# Patient Record
Sex: MALE | Race: WHITE | NOT HISPANIC OR LATINO | Employment: OTHER | RURAL
[De-identification: names, ages, dates, MRNs, and addresses within clinical notes are randomized per-mention and may not be internally consistent; named-entity substitution may affect disease eponyms.]

---

## 2017-01-01 ENCOUNTER — OFFICE VISIT (OUTPATIENT)
Dept: FAMILY MEDICINE CLINIC | Facility: CLINIC | Age: 77
End: 2017-01-01

## 2017-01-01 ENCOUNTER — OFFICE VISIT (OUTPATIENT)
Dept: VASCULAR SURGERY | Facility: CLINIC | Age: 77
End: 2017-01-01

## 2017-01-01 ENCOUNTER — HOSPITAL ENCOUNTER (OUTPATIENT)
Dept: ULTRASOUND IMAGING | Facility: HOSPITAL | Age: 77
Discharge: HOME | End: 2017-12-04
Admitting: NURSE PRACTITIONER

## 2017-01-01 ENCOUNTER — LAB (OUTPATIENT)
Dept: FAMILY MEDICINE CLINIC | Facility: CLINIC | Age: 77
End: 2017-01-01

## 2017-01-01 VITALS
HEART RATE: 70 BPM | TEMPERATURE: 98.5 F | DIASTOLIC BLOOD PRESSURE: 78 MMHG | RESPIRATION RATE: 16 BRPM | WEIGHT: 210 LBS | HEIGHT: 67 IN | SYSTOLIC BLOOD PRESSURE: 142 MMHG | BODY MASS INDEX: 32.96 KG/M2

## 2017-01-01 VITALS
BODY MASS INDEX: 32.65 KG/M2 | HEART RATE: 102 BPM | HEIGHT: 67 IN | DIASTOLIC BLOOD PRESSURE: 98 MMHG | WEIGHT: 208 LBS | SYSTOLIC BLOOD PRESSURE: 158 MMHG

## 2017-01-01 DIAGNOSIS — E78.2 MIXED HYPERLIPIDEMIA: ICD-10-CM

## 2017-01-01 DIAGNOSIS — J43.8 OTHER EMPHYSEMA (HCC): Primary | ICD-10-CM

## 2017-01-01 DIAGNOSIS — Z00.00 WELLNESS EXAMINATION: ICD-10-CM

## 2017-01-01 DIAGNOSIS — Z12.5 ENCOUNTER FOR SCREENING FOR MALIGNANT NEOPLASM OF PROSTATE: ICD-10-CM

## 2017-01-01 DIAGNOSIS — I71.40 ABDOMINAL AORTIC ANEURYSM (AAA) WITHOUT RUPTURE (HCC): ICD-10-CM

## 2017-01-01 DIAGNOSIS — E78.5 HYPERLIPIDEMIA, UNSPECIFIED HYPERLIPIDEMIA TYPE: Primary | ICD-10-CM

## 2017-01-01 DIAGNOSIS — Z98.890 S/P AAA (ABDOMINAL AORTIC ANEURYSM) REPAIR: ICD-10-CM

## 2017-01-01 DIAGNOSIS — Z72.0 TOBACCO ABUSE: ICD-10-CM

## 2017-01-01 DIAGNOSIS — Z86.79 S/P AAA (ABDOMINAL AORTIC ANEURYSM) REPAIR: ICD-10-CM

## 2017-01-01 DIAGNOSIS — I71.40 ABDOMINAL AORTIC ANEURYSM (AAA) WITHOUT RUPTURE (HCC): Primary | ICD-10-CM

## 2017-01-01 DIAGNOSIS — I99.9 VASCULAR ABNORMALITY: ICD-10-CM

## 2017-01-01 LAB
ALBUMIN SERPL-MCNC: 4 G/DL (ref 3.5–5)
ALBUMIN/GLOB SERPL: 1.4 G/DL (ref 1.1–2.5)
ALP SERPL-CCNC: 88 U/L (ref 24–120)
ALT SERPL-CCNC: 36 U/L (ref 0–54)
AST SERPL-CCNC: 26 U/L (ref 7–45)
BASOPHILS # BLD AUTO: 0.1 10*3/MM3 (ref 0–0.2)
BASOPHILS NFR BLD AUTO: 1 % (ref 0–2)
BILIRUB SERPL-MCNC: 0.7 MG/DL (ref 0.1–1)
BUN SERPL-MCNC: 18 MG/DL (ref 5–21)
BUN/CREAT SERPL: 22.8 (ref 7–25)
CALCIUM SERPL-MCNC: 9.2 MG/DL (ref 8.4–10.4)
CHLORIDE SERPL-SCNC: 102 MMOL/L (ref 98–110)
CHOLEST SERPL-MCNC: 143 MG/DL (ref 130–200)
CO2 SERPL-SCNC: 29 MMOL/L (ref 24–31)
CREAT SERPL-MCNC: 0.79 MG/DL (ref 0.5–1.4)
EOSINOPHIL # BLD AUTO: 1 10*3/MM3 (ref 0–0.7)
EOSINOPHIL NFR BLD AUTO: 9.7 % (ref 0–4)
ERYTHROCYTE [DISTWIDTH] IN BLOOD BY AUTOMATED COUNT: 12.8 % (ref 12–15)
GLOBULIN SER CALC-MCNC: 2.9 GM/DL
GLUCOSE SERPL-MCNC: 94 MG/DL (ref 70–100)
HCT VFR BLD AUTO: 50.3 % (ref 40–52)
HDLC SERPL-MCNC: 43 MG/DL
HGB BLD-MCNC: 16.4 G/DL (ref 14–18)
IMM GRANULOCYTES # BLD: 0.06 10*3/MM3 (ref 0–0.03)
IMM GRANULOCYTES NFR BLD: 0.6 % (ref 0–5)
LDLC SERPL CALC-MCNC: 84 MG/DL (ref 0–99)
LYMPHOCYTES # BLD AUTO: 2.78 10*3/MM3 (ref 0.72–4.86)
LYMPHOCYTES NFR BLD AUTO: 27.1 % (ref 15–45)
MCH RBC QN AUTO: 31 PG (ref 28–32)
MCHC RBC AUTO-ENTMCNC: 32.6 G/DL (ref 33–36)
MCV RBC AUTO: 95.1 FL (ref 82–95)
MONOCYTES # BLD AUTO: 1.13 10*3/MM3 (ref 0.19–1.3)
MONOCYTES NFR BLD AUTO: 11 % (ref 4–12)
NEUTROPHILS # BLD AUTO: 5.19 10*3/MM3 (ref 1.87–8.4)
NEUTROPHILS NFR BLD AUTO: 50.6 % (ref 39–78)
NRBC BLD AUTO-RTO: 0 /100 WBC (ref 0–0)
PLATELET # BLD AUTO: 232 10*3/MM3 (ref 130–400)
POTASSIUM SERPL-SCNC: 4.5 MMOL/L (ref 3.5–5.3)
PROT SERPL-MCNC: 6.9 G/DL (ref 6.3–8.7)
PSA SERPL-MCNC: 0.88 NG/ML (ref 0–4)
RBC # BLD AUTO: 5.29 10*6/MM3 (ref 4.8–5.9)
SODIUM SERPL-SCNC: 142 MMOL/L (ref 135–145)
TRIGL SERPL-MCNC: 81 MG/DL (ref 0–149)
VLDLC SERPL CALC-MCNC: 16.2 MG/DL
WBC # BLD AUTO: 10.26 10*3/MM3 (ref 4.8–10.8)

## 2017-01-01 PROCEDURE — 99213 OFFICE O/P EST LOW 20 MIN: CPT | Performed by: NURSE PRACTITIONER

## 2017-01-01 PROCEDURE — 76775 US EXAM ABDO BACK WALL LIM: CPT

## 2017-01-01 PROCEDURE — 99214 OFFICE O/P EST MOD 30 MIN: CPT | Performed by: FAMILY MEDICINE

## 2017-01-01 RX ORDER — LOVASTATIN 20 MG/1
20 TABLET ORAL NIGHTLY
Qty: 90 TABLET | Refills: 3 | Status: SHIPPED | OUTPATIENT
Start: 2017-01-01

## 2017-01-01 RX ORDER — LOVASTATIN 20 MG/1
20 TABLET ORAL NIGHTLY
Qty: 90 TABLET | Refills: 3 | Status: SHIPPED | OUTPATIENT
Start: 2017-01-01 | End: 2017-01-01 | Stop reason: SDUPTHER

## 2017-01-01 RX ORDER — ASPIRIN 81 MG/1
81 TABLET ORAL DAILY
COMMUNITY
End: 2018-01-01 | Stop reason: HOSPADM

## 2017-01-23 ENCOUNTER — TELEPHONE (OUTPATIENT)
Dept: FAMILY MEDICINE CLINIC | Facility: CLINIC | Age: 77
End: 2017-01-23

## 2017-01-23 RX ORDER — BUDESONIDE AND FORMOTEROL FUMARATE DIHYDRATE 160; 4.5 UG/1; UG/1
2 AEROSOL RESPIRATORY (INHALATION)
Qty: 3 INHALER | Refills: 1 | Status: SHIPPED | OUTPATIENT
Start: 2017-01-23 | End: 2017-06-28 | Stop reason: SDUPTHER

## 2017-01-24 ENCOUNTER — TELEPHONE (OUTPATIENT)
Dept: FAMILY MEDICINE CLINIC | Facility: CLINIC | Age: 77
End: 2017-01-24

## 2017-01-24 NOTE — TELEPHONE ENCOUNTER
Pt called cristobal the symbicort does work but he can not afford this med he wants to know if u can change to something cheaper [phone]

## 2017-06-02 ENCOUNTER — HOSPITAL ENCOUNTER (OUTPATIENT)
Dept: ULTRASOUND IMAGING | Facility: HOSPITAL | Age: 77
Discharge: HOME | End: 2017-06-02
Admitting: NURSE PRACTITIONER

## 2017-06-02 ENCOUNTER — OFFICE VISIT (OUTPATIENT)
Dept: VASCULAR SURGERY | Facility: CLINIC | Age: 77
End: 2017-06-02

## 2017-06-02 VITALS
WEIGHT: 200 LBS | DIASTOLIC BLOOD PRESSURE: 68 MMHG | HEIGHT: 67 IN | SYSTOLIC BLOOD PRESSURE: 118 MMHG | HEART RATE: 90 BPM | BODY MASS INDEX: 31.39 KG/M2

## 2017-06-02 DIAGNOSIS — I71.40 ABDOMINAL AORTIC ANEURYSM (AAA) WITHOUT RUPTURE (HCC): Primary | ICD-10-CM

## 2017-06-02 DIAGNOSIS — Z72.0 TOBACCO ABUSE: ICD-10-CM

## 2017-06-02 DIAGNOSIS — I71.40 ABDOMINAL AORTIC ANEURYSM (AAA) WITHOUT RUPTURE (HCC): ICD-10-CM

## 2017-06-02 PROCEDURE — 99214 OFFICE O/P EST MOD 30 MIN: CPT | Performed by: NURSE PRACTITIONER

## 2017-06-02 PROCEDURE — 76775 US EXAM ABDO BACK WALL LIM: CPT

## 2017-06-02 NOTE — PROGRESS NOTES
"06/02/2017      Jake Keller MD  [address] [place] [id number]        Dipesh Pruitt  1940    Chief Complaint   Patient presents with   • Follow-up     Abdominal ultrasound results.Denies symptoms.       Dear Jake Keller MD:    HPI     I had the pleasure of seeing you patient in the office today for follow up.  As you recall, the patient is a 76 y.o. male who started having some pain in his left leg. He had some lumbar x-rays revealing a possible large infrarenal abdominal aortic aneurysm. He denied any back pain or abdominal pain. He went then for a CTA of the abdomen and pelvis, which I personally reviewed, which shows an aneurysm measuring 6.7 cm. He did undergo endovascular abdominal aortic aneurysm repair on 10/19/16. He did have noninvasive testing done, which I did review as well as Dr. Rosado.         /68  Pulse 90  Ht 67\" (170.2 cm)  Wt 200 lb (90.7 kg)  BMI 31.32 kg/m2  Physical Exam  Constitutional: He is oriented to person, place, and time. He appears well-developed and well-nourished. No distress.   HENT:   Head: Normocephalic and atraumatic.   Mouth/Throat: Oropharynx is clear and moist and mucous membranes are normal.   Eyes: Pupils are equal, round, and reactive to light.   Neck: Normal range of motion. Neck supple. No JVD present. Carotid bruit is not present. No thyromegaly present.   Cardiovascular: Normal rate, regular rhythm, S1 normal, S2 normal and normal heart sounds. Exam reveals no gallop and no friction rub.   No murmur heard.  Pulses:  Femoral pulses are 2+ on the right side, and 2+ on the left side.  Popliteal pulses are 2+ on the right side, and 2+ on the left side.   Dorsalis pedis pulses are 0 on the right side, and 2+ on the left side.   Posterior tibial pulses are 2+ on the right side, and 2+ on the left side.   Bilateral groins healed   Right -DP doppler   Pulmonary/Chest: Effort normal and breath sounds normal.   Abdominal: Soft. " Normal appearance, normal aorta and bowel sounds are normal. He exhibits no abdominal bruit. There is no hepatosplenomegaly. There is no tenderness.   Musculoskeletal: Normal range of motion.     Vascular Status - His exam exhibits no right foot edema. His exam exhibits no left foot edema.  Neurological: He is alert and oriented to person, place, and time. He has normal strength. No cranial nerve deficit.   Skin: Skin is warm, dry and intact. He is not diaphoretic.   Psychiatric: He has a normal mood and affect. His behavior is normal. Judgment and thought content normal. Cognition and memory are normal.   Vitals reviewed.    DIAGNOSTIC DATA:    Us Aorta Limited    Result Date: 6/2/2017  Narrative: EXAMINATION:   US AORTA LIMITED-  6/2/2017 10:52 AM CDT  HISTORY: Abdominal aortic aneurysm  The patient is not NPO.  Images are obtained in transverse and longitudinal direction in the usual manner. The proximal aorta is obscured by bowel gas.  The mid aorta is 2.2 x 2.2 cm.  The infrarenal abdominal aorta is 6.5 x 6.5 cm. An endovascular stent graft is present. Flow is demonstrated in the stent graft. Flow is demonstrated in the endovascular iliac stents.      Impression: Native aorta is 6.5 x 6.5 cm. This contains an endovascular stent graft and flow is present in the stent. This report was finalized on 06/02/2017 13:50 by Dr. Toribio Martin MD.      Patient Active Problem List   Diagnosis   • Abdominal aortic aneurysm without rupture   • Preop cardiovascular exam   • Tobacco abuse   • Chronic obstructive pulmonary disease   • Vascular abnormality         ICD-10-CM ICD-9-CM   1. Abdominal aortic aneurysm (AAA) without rupture I71.4 441.4   2. Tobacco abuse Z72.0 305.1       Lab Frequency Next Occurrence   Duplex Carotid Ultrasound CAR Once 9/29/2016   CT Angiogram Abdomen Pelvis With & Without Contrast Once 11/23/2016   US Aorta Limited Once 12/2/2017       PLAN: After thoroughly evaluating Dipesh Pruitt I  believe the best course of action is to remain conservative from a vascular standpoint.  We will see Dipesh Pruitt back in 6 months with repeat noninvasive testing for continued surveillance.  The patient is to continue taking their medications as previously discussed.   This was all discussed in full with complete understanding.  Thank you for allowing me to participate in the care of your patient.  Please do not hesitate to call with any questions or concerns.  We will keep you aware of any further encounters with Dipesh Pruitt.      Sincerely Yours,        CALLIE Gilliam

## 2017-06-19 ENCOUNTER — LAB (OUTPATIENT)
Dept: FAMILY MEDICINE CLINIC | Facility: CLINIC | Age: 77
End: 2017-06-19

## 2017-06-19 DIAGNOSIS — E78.5 HYPERLIPIDEMIA, UNSPECIFIED HYPERLIPIDEMIA TYPE: Primary | ICD-10-CM

## 2017-06-19 LAB
ALBUMIN SERPL-MCNC: 3.8 G/DL (ref 3.5–5)
ALBUMIN/GLOB SERPL: 1.4 G/DL (ref 1.1–2.5)
ALP SERPL-CCNC: 80 U/L (ref 24–120)
ALT SERPL-CCNC: 30 U/L (ref 0–54)
AST SERPL-CCNC: 22 U/L (ref 7–45)
BASOPHILS # BLD AUTO: 0.08 10*3/MM3 (ref 0–0.2)
BASOPHILS NFR BLD AUTO: 0.7 % (ref 0–2)
BILIRUB SERPL-MCNC: 1 MG/DL (ref 0.1–1)
BUN SERPL-MCNC: 17 MG/DL (ref 5–21)
BUN/CREAT SERPL: 21.3 (ref 7–25)
CALCIUM SERPL-MCNC: 9.2 MG/DL (ref 8.4–10.4)
CHLORIDE SERPL-SCNC: 105 MMOL/L (ref 98–110)
CHOLEST SERPL-MCNC: 140 MG/DL (ref 130–200)
CO2 SERPL-SCNC: 27 MMOL/L (ref 24–31)
CREAT SERPL-MCNC: 0.8 MG/DL (ref 0.5–1.4)
EOSINOPHIL # BLD AUTO: 1.42 10*3/MM3 (ref 0–0.7)
EOSINOPHIL NFR BLD AUTO: 12.9 % (ref 0–4)
ERYTHROCYTE [DISTWIDTH] IN BLOOD BY AUTOMATED COUNT: 13.7 % (ref 12–15)
GLOBULIN SER CALC-MCNC: 2.8 GM/DL
GLUCOSE SERPL-MCNC: 87 MG/DL (ref 70–100)
HCT VFR BLD AUTO: 49.3 % (ref 40–52)
HDLC SERPL-MCNC: 44 MG/DL
HGB BLD-MCNC: 16.3 G/DL (ref 14–18)
IMM GRANULOCYTES # BLD: 0.06 10*3/MM3 (ref 0–0.03)
IMM GRANULOCYTES NFR BLD: 0.5 % (ref 0–5)
LDLC SERPL CALC-MCNC: 79 MG/DL (ref 0–99)
LYMPHOCYTES # BLD AUTO: 2.8 10*3/MM3 (ref 0.72–4.86)
LYMPHOCYTES NFR BLD AUTO: 25.4 % (ref 15–45)
MCH RBC QN AUTO: 31.1 PG (ref 28–32)
MCHC RBC AUTO-ENTMCNC: 33.1 G/DL (ref 33–36)
MCV RBC AUTO: 94.1 FL (ref 82–95)
MONOCYTES # BLD AUTO: 1.2 10*3/MM3 (ref 0.19–1.3)
MONOCYTES NFR BLD AUTO: 10.9 % (ref 4–12)
NEUTROPHILS # BLD AUTO: 5.48 10*3/MM3 (ref 1.87–8.4)
NEUTROPHILS NFR BLD AUTO: 49.6 % (ref 39–78)
PLATELET # BLD AUTO: 185 10*3/MM3 (ref 130–400)
POTASSIUM SERPL-SCNC: 4.3 MMOL/L (ref 3.5–5.3)
PROT SERPL-MCNC: 6.6 G/DL (ref 6.3–8.7)
RBC # BLD AUTO: 5.24 10*6/MM3 (ref 4.8–5.9)
SODIUM SERPL-SCNC: 142 MMOL/L (ref 135–145)
TRIGL SERPL-MCNC: 84 MG/DL (ref 0–149)
VLDLC SERPL CALC-MCNC: 16.8 MG/DL
WBC # BLD AUTO: 11.04 10*3/MM3 (ref 4.8–10.8)

## 2017-06-28 ENCOUNTER — OFFICE VISIT (OUTPATIENT)
Dept: FAMILY MEDICINE CLINIC | Facility: CLINIC | Age: 77
End: 2017-06-28

## 2017-06-28 VITALS
BODY MASS INDEX: 31.71 KG/M2 | HEIGHT: 67 IN | HEART RATE: 72 BPM | RESPIRATION RATE: 18 BRPM | SYSTOLIC BLOOD PRESSURE: 122 MMHG | OXYGEN SATURATION: 92 % | WEIGHT: 202 LBS | DIASTOLIC BLOOD PRESSURE: 72 MMHG

## 2017-06-28 DIAGNOSIS — E78.2 MIXED HYPERLIPIDEMIA: ICD-10-CM

## 2017-06-28 DIAGNOSIS — J43.8 OTHER EMPHYSEMA (HCC): Primary | ICD-10-CM

## 2017-06-28 DIAGNOSIS — Z72.0 TOBACCO ABUSE: ICD-10-CM

## 2017-06-28 DIAGNOSIS — C44.90 SKIN CANCER: ICD-10-CM

## 2017-06-28 PROCEDURE — 99214 OFFICE O/P EST MOD 30 MIN: CPT | Performed by: FAMILY MEDICINE

## 2017-06-28 RX ORDER — LOVASTATIN 20 MG/1
20 TABLET ORAL NIGHTLY
Qty: 90 TABLET | Refills: 1 | Status: SHIPPED | OUTPATIENT
Start: 2017-06-28 | End: 2017-01-01 | Stop reason: SDUPTHER

## 2017-06-28 RX ORDER — BUDESONIDE AND FORMOTEROL FUMARATE DIHYDRATE 160; 4.5 UG/1; UG/1
2 AEROSOL RESPIRATORY (INHALATION)
Qty: 3 INHALER | Refills: 1 | Status: SHIPPED | OUTPATIENT
Start: 2017-06-28 | End: 2017-01-01

## 2017-06-28 NOTE — PROGRESS NOTES
"Subjective   Dipesh Pruitt is a 76 y.o. male.     Chief Complaint   Patient presents with   • Follow-up     6 mo        History of Present Illness     he notes his breathing is stable --denies any hemoptysis ..toerating mevacor without myaglsi --still smoking 1/2 ppd..still seeing dr del valle after AAA repair ...seeing derm q 6mos for skin ca      Current Outpatient Prescriptions:   •  aspirin 81 MG tablet, Take 81 mg by mouth., Disp: , Rfl:   •  budesonide-formoterol (SYMBICORT) 160-4.5 MCG/ACT inhaler, Inhale 2 puffs 2 (Two) Times a Day., Disp: 3 inhaler, Rfl: 1  •  lovastatin (MEVACOR) 20 MG tablet, Take 1 tablet by mouth Every Night., Disp: 30 tablet, Rfl: 5  No Known Allergies    Past Medical History:   Diagnosis Date   • AAA (abdominal aortic aneurysm)    • Aneurysm     AAA   • COPD (chronic obstructive pulmonary disease)    • Full dentures    • Skin cancer     MULTIPLE   • Tobacco abuse    • Vision decreased     GLASSES     Past Surgical History:   Procedure Laterality Date   • CARDIOVASCULAR STRESS TEST  10/07/2016   • [place] AAA REPAIR,AORTO-AORTIC TUBE PROSTH N/A 10/19/2016    Procedure: ABDOMINAL AORTIC ANEURYSM REPAIR WITH ENDOGRAFT;  Surgeon: Reilly Del Valle DO;  Location: [facility] OR;  Service: Vascular   • SKIN BIOPSY     • SKIN CANCER EXCISION     • UMBILICAL HERNIA REPAIR         Review of Systems   Constitutional: Negative.    HENT: Negative.    Eyes: Negative.    Respiratory: Positive for shortness of breath.    Cardiovascular: Negative.    Gastrointestinal: Negative.    Endocrine: Negative.    Genitourinary: Negative.    Musculoskeletal: Negative.    Skin: Negative.    Allergic/Immunologic: Negative.    Neurological: Negative.    Hematological: Negative.    Psychiatric/Behavioral: Negative.        Objective  /72  Pulse 72  Resp 18  Ht 67\" (170.2 cm)  Wt 202 lb (91.6 kg)  SpO2 92%  BMI 31.64 kg/m2  Physical Exam   Constitutional: He is oriented to person, place, and time. He " appears well-developed and well-nourished.   HENT:   Head: Normocephalic and atraumatic.   Right Ear: External ear normal.   Left Ear: External ear normal.   Nose: Nose normal.   Mouth/Throat: Oropharynx is clear and moist.   Eyes: Conjunctivae and EOM are normal. Pupils are equal, round, and reactive to light.   Neck: Normal range of motion. Neck supple.   Cardiovascular: Normal rate, regular rhythm, normal heart sounds and intact distal pulses.    Pulmonary/Chest: Effort normal and breath sounds normal.   Abdominal: Soft. Bowel sounds are normal.   Musculoskeletal: Normal range of motion.   Neurological: He is alert and oriented to person, place, and time. He has normal reflexes.   Skin: Skin is warm and dry.   Psychiatric: He has a normal mood and affect. His behavior is normal. Judgment and thought content normal.   Nursing note and vitals reviewed.      Assessment/Plan   Dipesh was seen today for follow-up.    Diagnoses and all orders for this visit:    Other emphysema    Tobacco abuse    Mixed hyperlipidemia    Skin cancer    he will continue f/u with vascular and derm      He is not doing to do colon ca screening.lwe disacussed blood and stool screen but he refuses but will consider       No orders of the defined types were placed in this encounter.      Follow up: 6 month(s)

## 2017-06-29 ENCOUNTER — TELEPHONE (OUTPATIENT)
Dept: FAMILY MEDICINE CLINIC | Facility: CLINIC | Age: 77
End: 2017-06-29

## 2017-06-29 NOTE — TELEPHONE ENCOUNTER
Dipesh' wife, Portia, called and said that the Symbicort that was rx'd yesterday is too expensive and the pharmacist recommended Breo instead.   Do you want to change to Breo or do you want me to give samples of Symbicort?  [phone]

## 2017-12-04 NOTE — PROGRESS NOTES
"12/04/2017       Jake Keller MD  [address] [place] [id number]        Dipesh Pruitt  1940    Chief Complaint   Patient presents with   • Follow-up     Follow up for AAA with U/S of aorta study.       Dear Jake Keller MD:    HPI     I had the pleasure of seeing you patient in the office today for follow up.  As you recall, the patient is a 77 y.o. male who started having some pain in his left leg. He had some lumbar x-rays revealing a possible large infrarenal abdominal aortic aneurysm. He denied any back pain or abdominal pain. He went then for a CTA of the abdomen and pelvis, which I personally reviewed, which shows an aneurysm measuring 6.7 cm. He did undergo endovascular abdominal aortic aneurysm repair on 10/19/16. He did have noninvasive testing done, which I did review as well as Dr. Rosado.         /98  Pulse 102  Ht 170.2 cm (67\")  Wt 94.3 kg (208 lb)  BMI 32.58 kg/m2  Physical Exam  Constitutional: He is oriented to person, place, and time. He appears well-developed and well-nourished. No distress.   HENT:   Head: Normocephalic and atraumatic.   Mouth/Throat: Oropharynx is clear and moist and mucous membranes are normal.   Eyes: Pupils are equal, round, and reactive to light.   Neck: Normal range of motion. Neck supple. No JVD present. Carotid bruit is not present. No thyromegaly present.   Cardiovascular: Normal rate, regular rhythm, S1 normal, S2 normal and normal heart sounds. Exam reveals no gallop and no friction rub.   No murmur heard.  Pulses:  Femoral pulses are 2+ on the right side, and 2+ on the left side.  Popliteal pulses are 2+ on the right side, and 2+ on the left side.   Dorsalis pedis pulses are 0 on the right side, and 2+ on the left side.   Posterior tibial pulses are 2+ on the right side, and 2+ on the left side.   Bilateral groins healed   Right -DP doppler   Pulmonary/Chest: Effort normal and breath sounds normal.   Abdominal: Soft. " Normal appearance, normal aorta and bowel sounds are normal. He exhibits no abdominal bruit. There is no hepatosplenomegaly. There is no tenderness.   Musculoskeletal: Normal range of motion.     Vascular Status - His exam exhibits no right foot edema. His exam exhibits no left foot edema.  Neurological: He is alert and oriented to person, place, and time. He has normal strength. No cranial nerve deficit.   Skin: Skin is warm, dry and intact. He is not diaphoretic.   Psychiatric: He has a normal mood and affect. His behavior is normal. Judgment and thought content normal. Cognition and memory are normal.   Vitals reviewed.    DIAGNOSTIC DATA:    Us Aorta Limited    Result Date: 12/4/2017  Narrative: EXAMINATION: US AORTA LIMITED- 12/4/2017 10:41 AM CST  HISTORY: Abdominal aortic aneurysm, previous endograft repair.  REPORT: Transabdominal sonographic images of the aorta were obtained, comparison is made with the previous ultrasound 16 2017.  The proximal aorta measures 3.2 x 3.1 cm, mid aorta is obscured by overlying bowel gas. The mid to distal aorta measures 2.3 x 2.3 cm, normal, the distal most aorta is aneurysmal with an endograft, the diameter measures 6.4 x 6.3 cm. Maximum diameter on the previous study of 6.5 x 6.5 cm.      Impression: Minimal decrease in maximum diameter of the fusiform infrarenal abdominal aortic aneurysm, 6.4 x 6.3 cm, compared with 6.5 x 6.5 cm previously. There is evidence of previous endograft repair. This report was finalized on 12/04/2017 10:44 by Dr. Coleman Nicole MD.      Patient Active Problem List   Diagnosis   • Abdominal aortic aneurysm without rupture   • Preop cardiovascular exam   • Tobacco abuse   • Chronic obstructive pulmonary disease   • Vascular abnormality   • Mixed hyperlipidemia   • Skin cancer         ICD-10-CM ICD-9-CM   1. Abdominal aortic aneurysm (AAA) without rupture I71.4 441.4   2. S/P AAA (abdominal aortic aneurysm) repair Z98.890 V45.89    Z86.79    3.  Mixed hyperlipidemia E78.2 272.2       Lab Frequency Next Occurrence   Duplex Carotid Ultrasound CAR Once 9/29/2016   CT Angiogram Abdomen Pelvis With & Without Contrast Once 11/23/2016   US Aorta Limited Once 12/2/2017       PLAN: After thoroughly evaluating Dipesh Pruitt, I believe the best course of action is to remain conservative from a vascular standpoint.  His testing remains stable and his aneurysm is smaller than previous.  We will see Dipesh Pruitt back in 6 months with repeat noninvasive testing for continued surveillance. I did discuss vascular risk factors as they pertain to the progression of vascular disease including controlling his hyperlipidemia.  The patient is to continue taking their medications as previously discussed.   This was all discussed in full with complete understanding.  Thank you for allowing me to participate in the care of your patient.  Please do not hesitate to call with any questions or concerns.  We will keep you aware of any further encounters with Dipesh Pruitt.      Sincerely Yours,        CALLIE Gilliam

## 2017-12-27 NOTE — PROGRESS NOTES
"Subjective   Dipesh Pruitt is a 77 y.o. male.     Chief Complaint   Patient presents with   • Follow-up        History of Present Illness     he continues to smoke 1/2 ppd--he is followed by dr del valle for vascualr issues--he is tolerating statin witout myalgias --we note his bp today wituout cp or ha      Current Outpatient Prescriptions:   •  aspirin 81 MG EC tablet, Take 81 mg by mouth Daily., Disp: , Rfl:   •  lovastatin (MEVACOR) 20 MG tablet, Take 1 tablet by mouth Every Night., Disp: 90 tablet, Rfl: 1  No Known Allergies    Past Medical History:   Diagnosis Date   • AAA (abdominal aortic aneurysm)    • Aneurysm     AAA   • COPD (chronic obstructive pulmonary disease)    • Full dentures    • Skin cancer     MULTIPLE   • Tobacco abuse    • Vision decreased     GLASSES     Past Surgical History:   Procedure Laterality Date   • CARDIOVASCULAR STRESS TEST  10/07/2016   • [place] AAA REPAIR,AORTO-AORTIC TUBE PROSTH N/A 10/19/2016    Procedure: ABDOMINAL AORTIC ANEURYSM REPAIR WITH ENDOGRAFT;  Surgeon: Reilly Del Valle DO;  Location: [facility] OR;  Service: Vascular   • SKIN BIOPSY     • SKIN CANCER EXCISION     • UMBILICAL HERNIA REPAIR         Review of Systems   Constitutional: Negative.    HENT: Negative.    Eyes: Negative.    Respiratory: Positive for shortness of breath.    Cardiovascular: Negative.    Gastrointestinal: Negative.    Endocrine: Negative.    Genitourinary: Negative.    Musculoskeletal: Negative.    Skin: Negative.    Allergic/Immunologic: Negative.    Neurological: Negative.    Hematological: Negative.    Psychiatric/Behavioral: Negative.        Objective  /78  Pulse 70  Temp 98.5 °F (36.9 °C)  Resp 16  Ht 170.2 cm (67.01\")  Wt 95.3 kg (210 lb)  BMI 32.88 kg/m2  Physical Exam   Constitutional: He is oriented to person, place, and time. He appears well-developed and well-nourished.   HENT:   Head: Normocephalic and atraumatic.   Right Ear: External ear normal.   Left Ear: External " ear normal.   Nose: Nose normal.   Mouth/Throat: Oropharynx is clear and moist.   Eyes: Conjunctivae and EOM are normal. Pupils are equal, round, and reactive to light.   Neck: Normal range of motion. Neck supple.   Cardiovascular: Normal rate, regular rhythm, normal heart sounds and intact distal pulses.    Pulmonary/Chest: Effort normal and breath sounds normal.   Abdominal: Soft. Bowel sounds are normal.   Musculoskeletal: Normal range of motion.   Neurological: He is alert and oriented to person, place, and time. He has normal reflexes.   Skin: Skin is warm and dry.   Psychiatric: He has a normal mood and affect. His behavior is normal. Judgment and thought content normal.   Nursing note and vitals reviewed.      Assessment/Plan   Dipesh was seen today for follow-up.    Diagnoses and all orders for this visit:    Other emphysema    Mixed hyperlipidemia    Vascular abnormality    Tobacco abuse    I advised the patient of the risks in continuing to use tobacco, and I provided this patient with smoking cessation educational materials.  I also discussed how to quit smoking and the patient has expressed the willingness to quit.      During this visit, I spent 3-10 mintues counseling the patient regarding smoking cessation.   Patient's BMI is above normal parameters. Follow-up plan includes:  exercise counseling and nutrition counseling.    Samples of spiriva insrimat given  We discussed his labs         No orders of the defined types were placed in this encounter.      Follow up: 6 month(s)

## 2018-01-01 ENCOUNTER — APPOINTMENT (OUTPATIENT)
Dept: MRI IMAGING | Facility: HOSPITAL | Age: 78
End: 2018-01-01

## 2018-01-01 ENCOUNTER — HOSPITAL ENCOUNTER (OUTPATIENT)
Dept: ULTRASOUND IMAGING | Facility: HOSPITAL | Age: 78
Discharge: HOME | End: 2018-06-07
Admitting: NURSE PRACTITIONER

## 2018-01-01 ENCOUNTER — APPOINTMENT (OUTPATIENT)
Dept: CT IMAGING | Facility: HOSPITAL | Age: 78
End: 2018-01-01

## 2018-01-01 ENCOUNTER — APPOINTMENT (OUTPATIENT)
Dept: GENERAL RADIOLOGY | Facility: HOSPITAL | Age: 78
End: 2018-01-01

## 2018-01-01 ENCOUNTER — HOSPITAL ENCOUNTER (INPATIENT)
Facility: HOSPITAL | Age: 78
LOS: 18 days | End: 2018-07-08
Attending: FAMILY MEDICINE | Admitting: FAMILY MEDICINE

## 2018-01-01 ENCOUNTER — ANESTHESIA (OUTPATIENT)
Dept: PERIOP | Facility: HOSPITAL | Age: 78
End: 2018-01-01

## 2018-01-01 ENCOUNTER — APPOINTMENT (OUTPATIENT)
Dept: PULMONOLOGY | Facility: HOSPITAL | Age: 78
End: 2018-01-01
Attending: FAMILY MEDICINE

## 2018-01-01 ENCOUNTER — TELEPHONE (OUTPATIENT)
Dept: PODIATRY | Facility: CLINIC | Age: 78
End: 2018-01-01

## 2018-01-01 ENCOUNTER — APPOINTMENT (OUTPATIENT)
Dept: RADIATION ONCOLOGY | Facility: HOSPITAL | Age: 78
End: 2018-01-01

## 2018-01-01 ENCOUNTER — ANESTHESIA EVENT (OUTPATIENT)
Dept: PERIOP | Facility: HOSPITAL | Age: 78
End: 2018-01-01

## 2018-01-01 ENCOUNTER — TELEPHONE (OUTPATIENT)
Dept: FAMILY MEDICINE CLINIC | Facility: CLINIC | Age: 78
End: 2018-01-01

## 2018-01-01 ENCOUNTER — OFFICE VISIT (OUTPATIENT)
Dept: VASCULAR SURGERY | Facility: CLINIC | Age: 78
End: 2018-01-01

## 2018-01-01 VITALS
SYSTOLIC BLOOD PRESSURE: 121 MMHG | BODY MASS INDEX: 29.56 KG/M2 | RESPIRATION RATE: 22 BRPM | OXYGEN SATURATION: 76 % | WEIGHT: 177.4 LBS | HEART RATE: 117 BPM | TEMPERATURE: 98.7 F | HEIGHT: 65 IN | DIASTOLIC BLOOD PRESSURE: 86 MMHG

## 2018-01-01 VITALS
SYSTOLIC BLOOD PRESSURE: 154 MMHG | WEIGHT: 208 LBS | HEART RATE: 100 BPM | DIASTOLIC BLOOD PRESSURE: 84 MMHG | BODY MASS INDEX: 32.65 KG/M2 | HEIGHT: 67 IN

## 2018-01-01 DIAGNOSIS — E78.2 MIXED HYPERLIPIDEMIA: ICD-10-CM

## 2018-01-01 DIAGNOSIS — R91.8 HILAR MASS: Primary | ICD-10-CM

## 2018-01-01 DIAGNOSIS — R91.8 LUNG MASS: ICD-10-CM

## 2018-01-01 DIAGNOSIS — Z72.0 TOBACCO ABUSE: ICD-10-CM

## 2018-01-01 DIAGNOSIS — I65.23 BILATERAL CAROTID ARTERY STENOSIS: ICD-10-CM

## 2018-01-01 DIAGNOSIS — J43.8 OTHER EMPHYSEMA (HCC): ICD-10-CM

## 2018-01-01 DIAGNOSIS — I71.40 ABDOMINAL AORTIC ANEURYSM (AAA) WITHOUT RUPTURE (HCC): ICD-10-CM

## 2018-01-01 DIAGNOSIS — R91.8 HILAR MASS: ICD-10-CM

## 2018-01-01 DIAGNOSIS — R13.12 OROPHARYNGEAL DYSPHAGIA: ICD-10-CM

## 2018-01-01 DIAGNOSIS — I99.9 VASCULAR ABNORMALITY: ICD-10-CM

## 2018-01-01 DIAGNOSIS — Z98.890 S/P AAA (ABDOMINAL AORTIC ANEURYSM) REPAIR: ICD-10-CM

## 2018-01-01 DIAGNOSIS — I71.40 ABDOMINAL AORTIC ANEURYSM (AAA) WITHOUT RUPTURE (HCC): Primary | ICD-10-CM

## 2018-01-01 DIAGNOSIS — J96.01 ACUTE RESPIRATORY FAILURE WITH HYPOXIA (HCC): ICD-10-CM

## 2018-01-01 DIAGNOSIS — Z86.79 S/P AAA (ABDOMINAL AORTIC ANEURYSM) REPAIR: ICD-10-CM

## 2018-01-01 DIAGNOSIS — G93.9 BRAIN STEM LESION: Primary | ICD-10-CM

## 2018-01-01 LAB
ALBUMIN SERPL-MCNC: 3.3 G/DL (ref 3.5–5)
ALBUMIN SERPL-MCNC: 3.5 G/DL (ref 3.5–5)
ALBUMIN/GLOB SERPL: 1.1 G/DL (ref 1.1–2.5)
ALBUMIN/GLOB SERPL: 1.1 G/DL (ref 1.1–2.5)
ALP SERPL-CCNC: 60 U/L (ref 24–120)
ALP SERPL-CCNC: 63 U/L (ref 24–120)
ALT SERPL W P-5'-P-CCNC: 29 U/L (ref 0–54)
ALT SERPL W P-5'-P-CCNC: 30 U/L (ref 0–54)
AMMONIA BLD-SCNC: <9 UMOL/L (ref 9–33)
AMMONIA BLD-SCNC: <9 UMOL/L (ref 9–33)
ANION GAP SERPL CALCULATED.3IONS-SCNC: 10 MMOL/L (ref 4–13)
ANION GAP SERPL CALCULATED.3IONS-SCNC: 11 MMOL/L (ref 4–13)
ANION GAP SERPL CALCULATED.3IONS-SCNC: 12 MMOL/L (ref 4–13)
ANION GAP SERPL CALCULATED.3IONS-SCNC: 7 MMOL/L (ref 4–13)
ARTERIAL PATENCY WRIST A: POSITIVE
AST SERPL-CCNC: 32 U/L (ref 7–45)
AST SERPL-CCNC: 32 U/L (ref 7–45)
ATMOSPHERIC PRESS: 746 MMHG
ATMOSPHERIC PRESS: 752 MMHG
ATMOSPHERIC PRESS: 755 MMHG
BACTERIA SPEC RESP CULT: ABNORMAL
BACTERIA SPEC RESP CULT: ABNORMAL
BASE EXCESS BLDA CALC-SCNC: 3.9 MMOL/L (ref 0–2)
BASE EXCESS BLDA CALC-SCNC: 4.6 MMOL/L (ref 0–2)
BASE EXCESS BLDA CALC-SCNC: 4.8 MMOL/L (ref 0–2)
BASOPHILS # BLD AUTO: 0.02 10*3/MM3 (ref 0–0.2)
BASOPHILS # BLD AUTO: 0.02 10*3/MM3 (ref 0–0.2)
BASOPHILS NFR BLD AUTO: 0.1 % (ref 0–2)
BASOPHILS NFR BLD AUTO: 0.1 % (ref 0–2)
BDY SITE: ABNORMAL
BILIRUB SERPL-MCNC: 1.3 MG/DL (ref 0.1–1)
BILIRUB SERPL-MCNC: 1.7 MG/DL (ref 0.1–1)
BODY TEMPERATURE: 37 C
BUN BLD-MCNC: 25 MG/DL (ref 5–21)
BUN BLD-MCNC: 25 MG/DL (ref 5–21)
BUN BLD-MCNC: 27 MG/DL (ref 5–21)
BUN BLD-MCNC: 27 MG/DL (ref 5–21)
BUN/CREAT SERPL: 33.3 (ref 7–25)
BUN/CREAT SERPL: 37.9 (ref 7–25)
BUN/CREAT SERPL: 38.6 (ref 7–25)
BUN/CREAT SERPL: 43.5 (ref 7–25)
CALCIUM SPEC-SCNC: 8.8 MG/DL (ref 8.4–10.4)
CALCIUM SPEC-SCNC: 8.9 MG/DL (ref 8.4–10.4)
CHLORIDE SERPL-SCNC: 100 MMOL/L (ref 98–110)
CHLORIDE SERPL-SCNC: 101 MMOL/L (ref 98–110)
CHLORIDE SERPL-SCNC: 96 MMOL/L (ref 98–110)
CHLORIDE SERPL-SCNC: 99 MMOL/L (ref 98–110)
CO2 SERPL-SCNC: 27 MMOL/L (ref 24–31)
CO2 SERPL-SCNC: 29 MMOL/L (ref 24–31)
CO2 SERPL-SCNC: 32 MMOL/L (ref 24–31)
CO2 SERPL-SCNC: 33 MMOL/L (ref 24–31)
COHGB MFR BLD: 0.6 % (ref 0–5)
CREAT BLD-MCNC: 0.62 MG/DL (ref 0.5–1.4)
CREAT BLD-MCNC: 0.66 MG/DL (ref 0.5–1.4)
CREAT BLD-MCNC: 0.7 MG/DL (ref 0.5–1.4)
CREAT BLD-MCNC: 0.75 MG/DL (ref 0.5–1.4)
CRP SERPL-MCNC: 16.8 MG/DL (ref 0–0.99)
CYTO UR: NORMAL
D-LACTATE SERPL-SCNC: 1 MMOL/L (ref 0.5–2)
DEPRECATED RDW RBC AUTO: 42.5 FL (ref 40–54)
DEPRECATED RDW RBC AUTO: 42.6 FL (ref 40–54)
DEPRECATED RDW RBC AUTO: 42.8 FL (ref 40–54)
DEPRECATED RDW RBC AUTO: 43.8 FL (ref 40–54)
DEPRECATED RDW RBC AUTO: 45 FL (ref 40–54)
EOSINOPHIL # BLD AUTO: 0.03 10*3/MM3 (ref 0–0.7)
EOSINOPHIL # BLD AUTO: 0.18 10*3/MM3 (ref 0–0.7)
EOSINOPHIL NFR BLD AUTO: 0.2 % (ref 0–4)
EOSINOPHIL NFR BLD AUTO: 1.3 % (ref 0–4)
ERYTHROCYTE [DISTWIDTH] IN BLOOD BY AUTOMATED COUNT: 13 % (ref 12–15)
ERYTHROCYTE [DISTWIDTH] IN BLOOD BY AUTOMATED COUNT: 13.2 % (ref 12–15)
ERYTHROCYTE [DISTWIDTH] IN BLOOD BY AUTOMATED COUNT: 13.7 % (ref 12–15)
ERYTHROCYTE [SEDIMENTATION RATE] IN BLOOD: 42 MM/HR (ref 0–15)
GAS FLOW AIRWAY: 40 LPM
GAS FLOW AIRWAY: 40 LPM
GFR SERPL CREATININE-BSD FRML MDRD: 102 ML/MIN/1.73
GFR SERPL CREATININE-BSD FRML MDRD: 110 ML/MIN/1.73
GFR SERPL CREATININE-BSD FRML MDRD: 118 ML/MIN/1.73
GFR SERPL CREATININE-BSD FRML MDRD: 126 ML/MIN/1.73
GLOBULIN UR ELPH-MCNC: 2.9 GM/DL
GLOBULIN UR ELPH-MCNC: 3.1 GM/DL
GLUCOSE BLD-MCNC: 102 MG/DL (ref 70–100)
GLUCOSE BLD-MCNC: 105 MG/DL (ref 70–100)
GLUCOSE BLD-MCNC: 162 MG/DL (ref 70–100)
GLUCOSE BLD-MCNC: 86 MG/DL (ref 70–100)
GLUCOSE BLDC GLUCOMTR-MCNC: 109 MG/DL (ref 70–130)
GLUCOSE BLDC GLUCOMTR-MCNC: 98 MG/DL (ref 70–130)
GRAM STN SPEC: ABNORMAL
HCO3 BLDA-SCNC: 28.3 MMOL/L (ref 20–26)
HCO3 BLDA-SCNC: 29.8 MMOL/L (ref 20–26)
HCO3 BLDA-SCNC: 29.9 MMOL/L (ref 20–26)
HCT VFR BLD AUTO: 45 % (ref 40–52)
HCT VFR BLD AUTO: 46.1 % (ref 40–52)
HCT VFR BLD AUTO: 48.3 % (ref 40–52)
HCT VFR BLD AUTO: 49 % (ref 40–52)
HCT VFR BLD AUTO: 49.1 % (ref 40–52)
HCT VFR BLD CALC: 51.4 % (ref 38–51)
HGB BLD-MCNC: 15.4 G/DL (ref 14–18)
HGB BLD-MCNC: 15.8 G/DL (ref 14–18)
HGB BLD-MCNC: 16.1 G/DL (ref 14–18)
HGB BLD-MCNC: 16.5 G/DL (ref 14–18)
HGB BLD-MCNC: 16.6 G/DL (ref 14–18)
HGB BLDA-MCNC: 16.8 G/DL (ref 14–18)
HOROWITZ INDEX BLD+IHG-RTO: 100 %
HOROWITZ INDEX BLD+IHG-RTO: 21 %
HOROWITZ INDEX BLD+IHG-RTO: 50 %
IMM GRANULOCYTES # BLD: 0.08 10*3/MM3 (ref 0–0.03)
IMM GRANULOCYTES # BLD: 0.18 10*3/MM3 (ref 0–0.03)
IMM GRANULOCYTES NFR BLD: 0.6 % (ref 0–5)
IMM GRANULOCYTES NFR BLD: 1.3 % (ref 0–5)
KOH PREP NAIL: ABNORMAL
LAB AP CASE REPORT: NORMAL
LYMPHOCYTES # BLD AUTO: 1.64 10*3/MM3 (ref 0.72–4.86)
LYMPHOCYTES # BLD AUTO: 2.05 10*3/MM3 (ref 0.72–4.86)
LYMPHOCYTES NFR BLD AUTO: 11.9 % (ref 15–45)
LYMPHOCYTES NFR BLD AUTO: 14.4 % (ref 15–45)
Lab: ABNORMAL
Lab: ABNORMAL
Lab: NORMAL
MAGNESIUM SERPL-MCNC: 2.2 MG/DL (ref 1.4–2.2)
MCH RBC QN AUTO: 30 PG (ref 28–32)
MCH RBC QN AUTO: 30.6 PG (ref 28–32)
MCHC RBC AUTO-ENTMCNC: 33.3 G/DL (ref 33–36)
MCHC RBC AUTO-ENTMCNC: 33.7 G/DL (ref 33–36)
MCHC RBC AUTO-ENTMCNC: 33.8 G/DL (ref 33–36)
MCHC RBC AUTO-ENTMCNC: 34.2 G/DL (ref 33–36)
MCHC RBC AUTO-ENTMCNC: 34.3 G/DL (ref 33–36)
MCV RBC AUTO: 89.3 FL (ref 82–95)
MCV RBC AUTO: 89.3 FL (ref 82–95)
MCV RBC AUTO: 90.1 FL (ref 82–95)
MCV RBC AUTO: 90.4 FL (ref 82–95)
MCV RBC AUTO: 90.7 FL (ref 82–95)
METHGB BLD QL: 0.8 % (ref 0–3)
MODALITY: ABNORMAL
MONOCYTES # BLD AUTO: 1.41 10*3/MM3 (ref 0.19–1.3)
MONOCYTES # BLD AUTO: 1.72 10*3/MM3 (ref 0.19–1.3)
MONOCYTES NFR BLD AUTO: 10.3 % (ref 4–12)
MONOCYTES NFR BLD AUTO: 12 % (ref 4–12)
NEUTROPHILS # BLD AUTO: 10.31 10*3/MM3 (ref 1.87–8.4)
NEUTROPHILS # BLD AUTO: 10.38 10*3/MM3 (ref 1.87–8.4)
NEUTROPHILS NFR BLD AUTO: 72.7 % (ref 39–78)
NEUTROPHILS NFR BLD AUTO: 75.1 % (ref 39–78)
NRBC BLD MANUAL-RTO: 0 /100 WBC (ref 0–0)
NRBC BLD MANUAL-RTO: 0 /100 WBC (ref 0–0)
NT-PROBNP SERPL-MCNC: 140 PG/ML (ref 0–900)
OXYHGB MFR BLDV: 93.9 % (ref 94–99)
PATH REPORT.FINAL DX SPEC: NORMAL
PATH REPORT.GROSS SPEC: NORMAL
PCO2 BLDA: 40.9 MM HG (ref 35–45)
PCO2 BLDA: 44 MM HG (ref 35–45)
PCO2 BLDA: 45.2 MM HG (ref 35–45)
PCO2 TEMP ADJ BLD: ABNORMAL MM HG (ref 35–45)
PH BLDA: 7.43 PH UNITS (ref 7.35–7.45)
PH BLDA: 7.44 PH UNITS (ref 7.35–7.45)
PH BLDA: 7.45 PH UNITS (ref 7.35–7.45)
PH, TEMP CORRECTED: ABNORMAL PH UNITS (ref 7.35–7.45)
PLATELET # BLD AUTO: 200 10*3/MM3 (ref 130–400)
PLATELET # BLD AUTO: 223 10*3/MM3 (ref 130–400)
PLATELET # BLD AUTO: 246 10*3/MM3 (ref 130–400)
PLATELET # BLD AUTO: 262 10*3/MM3 (ref 130–400)
PLATELET # BLD AUTO: 276 10*3/MM3 (ref 130–400)
PMV BLD AUTO: 10.3 FL (ref 6–12)
PMV BLD AUTO: 10.4 FL (ref 6–12)
PMV BLD AUTO: 10.6 FL (ref 6–12)
PMV BLD AUTO: 11 FL (ref 6–12)
PMV BLD AUTO: 11.1 FL (ref 6–12)
PO2 BLDA: 66.1 MM HG (ref 83–108)
PO2 BLDA: 75.7 MM HG (ref 83–108)
PO2 BLDA: 85.1 MM HG (ref 83–108)
PO2 TEMP ADJ BLD: ABNORMAL MM HG (ref 83–108)
POTASSIUM BLD-SCNC: 3.8 MMOL/L (ref 3.5–5.3)
POTASSIUM BLD-SCNC: 4.1 MMOL/L (ref 3.5–5.3)
POTASSIUM BLD-SCNC: 4.2 MMOL/L (ref 3.5–5.3)
POTASSIUM BLD-SCNC: 4.6 MMOL/L (ref 3.5–5.3)
POTASSIUM BLDA-SCNC: 4.3 MMOL/L (ref 3.5–5.2)
PROT SERPL-MCNC: 6.2 G/DL (ref 6.3–8.7)
PROT SERPL-MCNC: 6.6 G/DL (ref 6.3–8.7)
RBC # BLD AUTO: 5.04 10*6/MM3 (ref 4.8–5.9)
RBC # BLD AUTO: 5.16 10*6/MM3 (ref 4.8–5.9)
RBC # BLD AUTO: 5.36 10*6/MM3 (ref 4.8–5.9)
RBC # BLD AUTO: 5.4 10*6/MM3 (ref 4.8–5.9)
RBC # BLD AUTO: 5.43 10*6/MM3 (ref 4.8–5.9)
SAO2 % BLDCOA: 92.7 % (ref 94–99)
SAO2 % BLDCOA: 95.3 % (ref 94–99)
SAO2 % BLDCOA: 96.8 % (ref 94–99)
SODIUM BLD-SCNC: 136 MMOL/L (ref 135–145)
SODIUM BLD-SCNC: 139 MMOL/L (ref 135–145)
SODIUM BLD-SCNC: 140 MMOL/L (ref 135–145)
SODIUM BLD-SCNC: 142 MMOL/L (ref 135–145)
SODIUM BLDA-SCNC: 140 MMOL/L (ref 136–145)
TROPONIN I SERPL-MCNC: <0.012 NG/ML (ref 0–0.03)
VENTILATOR MODE: ABNORMAL
WBC NRBC COR # BLD: 12.24 10*3/MM3 (ref 4.8–10.8)
WBC NRBC COR # BLD: 13.74 10*3/MM3 (ref 4.8–10.8)
WBC NRBC COR # BLD: 14.28 10*3/MM3 (ref 4.8–10.8)
WBC NRBC COR # BLD: 15.46 10*3/MM3 (ref 4.8–10.8)
WBC NRBC COR # BLD: 19.46 10*3/MM3 (ref 4.8–10.8)

## 2018-01-01 PROCEDURE — 94799 UNLISTED PULMONARY SVC/PX: CPT

## 2018-01-01 PROCEDURE — 97161 PT EVAL LOW COMPLEX 20 MIN: CPT

## 2018-01-01 PROCEDURE — 86140 C-REACTIVE PROTEIN: CPT | Performed by: FAMILY MEDICINE

## 2018-01-01 PROCEDURE — A9577 INJ MULTIHANCE: HCPCS | Performed by: FAMILY MEDICINE

## 2018-01-01 PROCEDURE — 94640 AIRWAY INHALATION TREATMENT: CPT

## 2018-01-01 PROCEDURE — 77412 RADIATION TX DELIVERY LVL 3: CPT | Performed by: RADIOLOGY

## 2018-01-01 PROCEDURE — 99231 SBSQ HOSP IP/OBS SF/LOW 25: CPT | Performed by: FAMILY MEDICINE

## 2018-01-01 PROCEDURE — 97110 THERAPEUTIC EXERCISES: CPT

## 2018-01-01 PROCEDURE — 70450 CT HEAD/BRAIN W/O DYE: CPT

## 2018-01-01 PROCEDURE — 71045 X-RAY EXAM CHEST 1 VIEW: CPT

## 2018-01-01 PROCEDURE — 94760 N-INVAS EAR/PLS OXIMETRY 1: CPT

## 2018-01-01 PROCEDURE — 87206 SMEAR FLUORESCENT/ACID STAI: CPT | Performed by: INTERNAL MEDICINE

## 2018-01-01 PROCEDURE — 25010000002 PROPOFOL 10 MG/ML EMULSION: Performed by: NURSE ANESTHETIST, CERTIFIED REGISTERED

## 2018-01-01 PROCEDURE — 85027 COMPLETE CBC AUTOMATED: CPT | Performed by: FAMILY MEDICINE

## 2018-01-01 PROCEDURE — 25010000002 DEXAMETHASONE PER 1 MG: Performed by: INTERNAL MEDICINE

## 2018-01-01 PROCEDURE — 94668 MNPJ CHEST WALL SBSQ: CPT

## 2018-01-01 PROCEDURE — 25010000002 IOPAMIDOL 61 % SOLUTION: Performed by: FAMILY MEDICINE

## 2018-01-01 PROCEDURE — 87220 TISSUE EXAM FOR FUNGI: CPT | Performed by: INTERNAL MEDICINE

## 2018-01-01 PROCEDURE — 92526 ORAL FUNCTION THERAPY: CPT

## 2018-01-01 PROCEDURE — 25010000002 FENTANYL CITRATE (PF) 100 MCG/2ML SOLUTION: Performed by: NURSE ANESTHETIST, CERTIFIED REGISTERED

## 2018-01-01 PROCEDURE — G8996 SWALLOW CURRENT STATUS: HCPCS

## 2018-01-01 PROCEDURE — 25010000002 CEFTRIAXONE PER 250 MG: Performed by: INTERNAL MEDICINE

## 2018-01-01 PROCEDURE — 88104 CYTOPATH FL NONGYN SMEARS: CPT | Performed by: INTERNAL MEDICINE

## 2018-01-01 PROCEDURE — 97116 GAIT TRAINING THERAPY: CPT

## 2018-01-01 PROCEDURE — 87070 CULTURE OTHR SPECIMN AEROBIC: CPT | Performed by: INTERNAL MEDICINE

## 2018-01-01 PROCEDURE — 94669 MECHANICAL CHEST WALL OSCILL: CPT

## 2018-01-01 PROCEDURE — 82962 GLUCOSE BLOOD TEST: CPT

## 2018-01-01 PROCEDURE — 77417 THER RADIOLOGY PORT IMAGE(S): CPT | Performed by: RADIOLOGY

## 2018-01-01 PROCEDURE — 77336 RADIATION PHYSICS CONSULT: CPT | Performed by: RADIOLOGY

## 2018-01-01 PROCEDURE — 25010000002 LORAZEPAM PER 2 MG: Performed by: FAMILY MEDICINE

## 2018-01-01 PROCEDURE — 70553 MRI BRAIN STEM W/O & W/DYE: CPT

## 2018-01-01 PROCEDURE — 25010000002 ENOXAPARIN PER 10 MG: Performed by: FAMILY MEDICINE

## 2018-01-01 PROCEDURE — 25010000002 SUCCINYLCHOLINE PER 20 MG: Performed by: NURSE ANESTHETIST, CERTIFIED REGISTERED

## 2018-01-01 PROCEDURE — 94667 MNPJ CHEST WALL 1ST: CPT

## 2018-01-01 PROCEDURE — 82805 BLOOD GASES W/O2 SATURATION: CPT

## 2018-01-01 PROCEDURE — 87102 FUNGUS ISOLATION CULTURE: CPT | Performed by: INTERNAL MEDICINE

## 2018-01-01 PROCEDURE — 51798 US URINE CAPACITY MEASURE: CPT

## 2018-01-01 PROCEDURE — 88173 CYTOPATH EVAL FNA REPORT: CPT | Performed by: INTERNAL MEDICINE

## 2018-01-01 PROCEDURE — 87116 MYCOBACTERIA CULTURE: CPT | Performed by: INTERNAL MEDICINE

## 2018-01-01 PROCEDURE — 88172 CYTP DX EVAL FNA 1ST EA SITE: CPT | Performed by: INTERNAL MEDICINE

## 2018-01-01 PROCEDURE — 25010000002 METHYLPREDNISOLONE PER 125 MG: Performed by: INTERNAL MEDICINE

## 2018-01-01 PROCEDURE — 51701 INSERT BLADDER CATHETER: CPT

## 2018-01-01 PROCEDURE — 07D78ZX EXTRACTION OF THORAX LYMPHATIC, VIA NATURAL OR ARTIFICIAL OPENING ENDOSCOPIC, DIAGNOSTIC: ICD-10-PCS | Performed by: FAMILY MEDICINE

## 2018-01-01 PROCEDURE — 82803 BLOOD GASES ANY COMBINATION: CPT

## 2018-01-01 PROCEDURE — 88185 FLOWCYTOMETRY/TC ADD-ON: CPT | Performed by: INTERNAL MEDICINE

## 2018-01-01 PROCEDURE — 0 FLUDEOXYGLUCOSE F18 SOLUTION: Performed by: INTERNAL MEDICINE

## 2018-01-01 PROCEDURE — 99231 SBSQ HOSP IP/OBS SF/LOW 25: CPT | Performed by: NEUROLOGICAL SURGERY

## 2018-01-01 PROCEDURE — 25010000002 METHYLPREDNISOLONE PER 40 MG: Performed by: INTERNAL MEDICINE

## 2018-01-01 PROCEDURE — 78815 PET IMAGE W/CT SKULL-THIGH: CPT

## 2018-01-01 PROCEDURE — 83880 ASSAY OF NATRIURETIC PEPTIDE: CPT | Performed by: INTERNAL MEDICINE

## 2018-01-01 PROCEDURE — 87205 SMEAR GRAM STAIN: CPT | Performed by: INTERNAL MEDICINE

## 2018-01-01 PROCEDURE — 85651 RBC SED RATE NONAUTOMATED: CPT | Performed by: FAMILY MEDICINE

## 2018-01-01 PROCEDURE — G8978 MOBILITY CURRENT STATUS: HCPCS

## 2018-01-01 PROCEDURE — 85025 COMPLETE CBC W/AUTO DIFF WBC: CPT | Performed by: FAMILY MEDICINE

## 2018-01-01 PROCEDURE — 88305 TISSUE EXAM BY PATHOLOGIST: CPT | Performed by: INTERNAL MEDICINE

## 2018-01-01 PROCEDURE — C1726 CATH, BAL DIL, NON-VASCULAR: HCPCS | Performed by: INTERNAL MEDICINE

## 2018-01-01 PROCEDURE — 99213 OFFICE O/P EST LOW 20 MIN: CPT | Performed by: NURSE PRACTITIONER

## 2018-01-01 PROCEDURE — 25010000002 AZITHROMYCIN PER 500 MG: Performed by: INTERNAL MEDICINE

## 2018-01-01 PROCEDURE — 97530 THERAPEUTIC ACTIVITIES: CPT

## 2018-01-01 PROCEDURE — G8997 SWALLOW GOAL STATUS: HCPCS

## 2018-01-01 PROCEDURE — 77290 THER RAD SIMULAJ FIELD CPLX: CPT | Performed by: RADIOLOGY

## 2018-01-01 PROCEDURE — 82140 ASSAY OF AMMONIA: CPT | Performed by: FAMILY MEDICINE

## 2018-01-01 PROCEDURE — 36600 WITHDRAWAL OF ARTERIAL BLOOD: CPT

## 2018-01-01 PROCEDURE — 99238 HOSP IP/OBS DSCHRG MGMT 30/<: CPT | Performed by: FAMILY MEDICINE

## 2018-01-01 PROCEDURE — 92526 ORAL FUNCTION THERAPY: CPT | Performed by: SPEECH-LANGUAGE PATHOLOGIST

## 2018-01-01 PROCEDURE — 88334 PATH CONSLTJ SURG CYTO XM EA: CPT | Performed by: INTERNAL MEDICINE

## 2018-01-01 PROCEDURE — 70544 MR ANGIOGRAPHY HEAD W/O DYE: CPT

## 2018-01-01 PROCEDURE — 70546 MR ANGIOGRAPH HEAD W/O&W/DYE: CPT

## 2018-01-01 PROCEDURE — 74018 RADEX ABDOMEN 1 VIEW: CPT

## 2018-01-01 PROCEDURE — 0 GADOBENATE DIMEGLUMINE 529 MG/ML SOLUTION: Performed by: FAMILY MEDICINE

## 2018-01-01 PROCEDURE — 83605 ASSAY OF LACTIC ACID: CPT | Performed by: FAMILY MEDICINE

## 2018-01-01 PROCEDURE — 83735 ASSAY OF MAGNESIUM: CPT | Performed by: FAMILY MEDICINE

## 2018-01-01 PROCEDURE — 92610 EVALUATE SWALLOWING FUNCTION: CPT

## 2018-01-01 PROCEDURE — 0BDG8ZX EXTRACTION OF LEFT UPPER LUNG LOBE, VIA NATURAL OR ARTIFICIAL OPENING ENDOSCOPIC, DIAGNOSTIC: ICD-10-PCS | Performed by: FAMILY MEDICINE

## 2018-01-01 PROCEDURE — 99223 1ST HOSP IP/OBS HIGH 75: CPT | Performed by: NEUROLOGICAL SURGERY

## 2018-01-01 PROCEDURE — 88184 FLOWCYTOMETRY/ TC 1 MARKER: CPT | Performed by: INTERNAL MEDICINE

## 2018-01-01 PROCEDURE — 80048 BASIC METABOLIC PNL TOTAL CA: CPT | Performed by: FAMILY MEDICINE

## 2018-01-01 PROCEDURE — 80053 COMPREHEN METABOLIC PANEL: CPT | Performed by: FAMILY MEDICINE

## 2018-01-01 PROCEDURE — 77334 RADIATION TREATMENT AID(S): CPT | Performed by: RADIOLOGY

## 2018-01-01 PROCEDURE — 94660 CPAP INITIATION&MGMT: CPT

## 2018-01-01 PROCEDURE — 77295 3-D RADIOTHERAPY PLAN: CPT | Performed by: RADIOLOGY

## 2018-01-01 PROCEDURE — 76775 US EXAM ABDO BACK WALL LIM: CPT

## 2018-01-01 PROCEDURE — 82375 ASSAY CARBOXYHB QUANT: CPT

## 2018-01-01 PROCEDURE — 25010000002 ONDANSETRON PER 1 MG: Performed by: NURSE ANESTHETIST, CERTIFIED REGISTERED

## 2018-01-01 PROCEDURE — 0T9B30Z DRAINAGE OF BLADDER WITH DRAINAGE DEVICE, PERCUTANEOUS APPROACH: ICD-10-PCS | Performed by: FAMILY MEDICINE

## 2018-01-01 PROCEDURE — 77300 RADIATION THERAPY DOSE PLAN: CPT | Performed by: RADIOLOGY

## 2018-01-01 PROCEDURE — 51703 INSERT BLADDER CATH COMPLEX: CPT

## 2018-01-01 PROCEDURE — 99231 SBSQ HOSP IP/OBS SF/LOW 25: CPT | Performed by: THORACIC SURGERY (CARDIOTHORACIC VASCULAR SURGERY)

## 2018-01-01 PROCEDURE — 70470 CT HEAD/BRAIN W/O & W/DYE: CPT

## 2018-01-01 PROCEDURE — 99222 1ST HOSP IP/OBS MODERATE 55: CPT | Performed by: THORACIC SURGERY (CARDIOTHORACIC VASCULAR SURGERY)

## 2018-01-01 PROCEDURE — 88342 IMHCHEM/IMCYTCHM 1ST ANTB: CPT | Performed by: INTERNAL MEDICINE

## 2018-01-01 PROCEDURE — 94618 PULMONARY STRESS TESTING: CPT

## 2018-01-01 PROCEDURE — G8979 MOBILITY GOAL STATUS: HCPCS

## 2018-01-01 PROCEDURE — 83050 HGB METHEMOGLOBIN QUAN: CPT

## 2018-01-01 PROCEDURE — A9552 F18 FDG: HCPCS | Performed by: INTERNAL MEDICINE

## 2018-01-01 PROCEDURE — 84484 ASSAY OF TROPONIN QUANT: CPT | Performed by: FAMILY MEDICINE

## 2018-01-01 PROCEDURE — 99221 1ST HOSP IP/OBS SF/LOW 40: CPT | Performed by: FAMILY MEDICINE

## 2018-01-01 RX ORDER — ONDANSETRON 2 MG/ML
4 INJECTION INTRAMUSCULAR; INTRAVENOUS AS NEEDED
Status: DISCONTINUED | OUTPATIENT
Start: 2018-01-01 | End: 2018-01-01 | Stop reason: HOSPADM

## 2018-01-01 RX ORDER — IPRATROPIUM BROMIDE AND ALBUTEROL SULFATE 2.5; .5 MG/3ML; MG/3ML
3 SOLUTION RESPIRATORY (INHALATION) ONCE AS NEEDED
Status: COMPLETED | OUTPATIENT
Start: 2018-01-01 | End: 2018-01-01

## 2018-01-01 RX ORDER — METHYLPREDNISOLONE SODIUM SUCCINATE 125 MG/2ML
INJECTION, POWDER, LYOPHILIZED, FOR SOLUTION INTRAMUSCULAR; INTRAVENOUS
Status: DISPENSED
Start: 2018-01-01 | End: 2018-01-01

## 2018-01-01 RX ORDER — METHYLPREDNISOLONE SODIUM SUCCINATE 40 MG/ML
40 INJECTION, POWDER, LYOPHILIZED, FOR SOLUTION INTRAMUSCULAR; INTRAVENOUS EVERY 6 HOURS
Status: DISCONTINUED | OUTPATIENT
Start: 2018-01-01 | End: 2018-01-01

## 2018-01-01 RX ORDER — NYSTATIN 100000 [USP'U]/G
POWDER TOPICAL EVERY 8 HOURS SCHEDULED
Status: DISCONTINUED | OUTPATIENT
Start: 2018-01-01 | End: 2018-07-09 | Stop reason: HOSPADM

## 2018-01-01 RX ORDER — SODIUM CHLORIDE 0.9 % (FLUSH) 0.9 %
1-10 SYRINGE (ML) INJECTION AS NEEDED
Status: CANCELLED | OUTPATIENT
Start: 2018-01-01

## 2018-01-01 RX ORDER — FLUCONAZOLE 100 MG/1
100 TABLET ORAL EVERY 24 HOURS
Status: DISCONTINUED | OUTPATIENT
Start: 2018-01-01 | End: 2018-01-01

## 2018-01-01 RX ORDER — SCOLOPAMINE TRANSDERMAL SYSTEM 1 MG/1
2 PATCH, EXTENDED RELEASE TRANSDERMAL
Status: DISCONTINUED | OUTPATIENT
Start: 2018-01-01 | End: 2018-07-09 | Stop reason: HOSPADM

## 2018-01-01 RX ORDER — FENTANYL CITRATE 50 UG/ML
INJECTION, SOLUTION INTRAMUSCULAR; INTRAVENOUS AS NEEDED
Status: DISCONTINUED | OUTPATIENT
Start: 2018-01-01 | End: 2018-01-01 | Stop reason: SURG

## 2018-01-01 RX ORDER — LORAZEPAM 2 MG/ML
0.5 INJECTION INTRAMUSCULAR EVERY 4 HOURS
Status: DISCONTINUED | OUTPATIENT
Start: 2018-01-01 | End: 2018-01-01

## 2018-01-01 RX ORDER — IPRATROPIUM BROMIDE AND ALBUTEROL SULFATE 2.5; .5 MG/3ML; MG/3ML
3 SOLUTION RESPIRATORY (INHALATION)
Status: DISCONTINUED | OUTPATIENT
Start: 2018-01-01 | End: 2018-07-09 | Stop reason: HOSPADM

## 2018-01-01 RX ORDER — MEPERIDINE HYDROCHLORIDE 50 MG/ML
12.5 INJECTION INTRAMUSCULAR; INTRAVENOUS; SUBCUTANEOUS
Status: DISCONTINUED | OUTPATIENT
Start: 2018-01-01 | End: 2018-01-01 | Stop reason: HOSPADM

## 2018-01-01 RX ORDER — DEXAMETHASONE SODIUM PHOSPHATE 4 MG/ML
4 INJECTION, SOLUTION INTRA-ARTICULAR; INTRALESIONAL; INTRAMUSCULAR; INTRAVENOUS; SOFT TISSUE EVERY 8 HOURS
Status: DISCONTINUED | OUTPATIENT
Start: 2018-01-01 | End: 2018-01-01

## 2018-01-01 RX ORDER — NALOXONE HCL 0.4 MG/ML
0.04 VIAL (ML) INJECTION AS NEEDED
Status: DISCONTINUED | OUTPATIENT
Start: 2018-01-01 | End: 2018-01-01 | Stop reason: HOSPADM

## 2018-01-01 RX ORDER — MAGNESIUM CARB/ALUMINUM HYDROX 105-160MG
296 TABLET,CHEWABLE ORAL ONCE
Status: COMPLETED | OUTPATIENT
Start: 2018-01-01 | End: 2018-01-01

## 2018-01-01 RX ORDER — FENTANYL CITRATE 50 UG/ML
25 INJECTION, SOLUTION INTRAMUSCULAR; INTRAVENOUS AS NEEDED
Status: DISCONTINUED | OUTPATIENT
Start: 2018-01-01 | End: 2018-01-01 | Stop reason: HOSPADM

## 2018-01-01 RX ORDER — SODIUM CHLORIDE 0.9 % (FLUSH) 0.9 %
1-10 SYRINGE (ML) INJECTION AS NEEDED
Status: DISCONTINUED | OUTPATIENT
Start: 2018-01-01 | End: 2018-01-01

## 2018-01-01 RX ORDER — LORAZEPAM 2 MG/ML
1 CONCENTRATE ORAL
Status: DISCONTINUED | OUTPATIENT
Start: 2018-01-01 | End: 2018-07-09 | Stop reason: HOSPADM

## 2018-01-01 RX ORDER — SODIUM CHLORIDE, SODIUM LACTATE, POTASSIUM CHLORIDE, CALCIUM CHLORIDE 600; 310; 30; 20 MG/100ML; MG/100ML; MG/100ML; MG/100ML
100 INJECTION, SOLUTION INTRAVENOUS CONTINUOUS
Status: DISCONTINUED | OUTPATIENT
Start: 2018-01-01 | End: 2018-01-01

## 2018-01-01 RX ORDER — LABETALOL HYDROCHLORIDE 5 MG/ML
5 INJECTION, SOLUTION INTRAVENOUS
Status: DISCONTINUED | OUTPATIENT
Start: 2018-01-01 | End: 2018-01-01 | Stop reason: HOSPADM

## 2018-01-01 RX ORDER — SUCCINYLCHOLINE CHLORIDE 20 MG/ML
INJECTION INTRAMUSCULAR; INTRAVENOUS AS NEEDED
Status: DISCONTINUED | OUTPATIENT
Start: 2018-01-01 | End: 2018-01-01 | Stop reason: SURG

## 2018-01-01 RX ORDER — DOCUSATE SODIUM 100 MG/1
100 CAPSULE, LIQUID FILLED ORAL 2 TIMES DAILY
Status: DISCONTINUED | OUTPATIENT
Start: 2018-01-01 | End: 2018-01-01

## 2018-01-01 RX ORDER — METOCLOPRAMIDE HYDROCHLORIDE 5 MG/ML
5 INJECTION INTRAMUSCULAR; INTRAVENOUS
Status: DISCONTINUED | OUTPATIENT
Start: 2018-01-01 | End: 2018-01-01 | Stop reason: HOSPADM

## 2018-01-01 RX ORDER — DEXAMETHASONE SODIUM PHOSPHATE 4 MG/ML
4 INJECTION, SOLUTION INTRA-ARTICULAR; INTRALESIONAL; INTRAMUSCULAR; INTRAVENOUS; SOFT TISSUE EVERY 6 HOURS
Status: DISCONTINUED | OUTPATIENT
Start: 2018-01-01 | End: 2018-01-01

## 2018-01-01 RX ORDER — MORPHINE SULFATE 20 MG/ML
2 SOLUTION ORAL
Status: DISCONTINUED | OUTPATIENT
Start: 2018-01-01 | End: 2018-07-09 | Stop reason: HOSPADM

## 2018-01-01 RX ORDER — IPRATROPIUM BROMIDE AND ALBUTEROL SULFATE 2.5; .5 MG/3ML; MG/3ML
3 SOLUTION RESPIRATORY (INHALATION)
Status: CANCELLED | OUTPATIENT
Start: 2018-01-01

## 2018-01-01 RX ORDER — MORPHINE SULFATE 2 MG/ML
2 INJECTION, SOLUTION INTRAMUSCULAR; INTRAVENOUS
Status: DISCONTINUED | OUTPATIENT
Start: 2018-01-01 | End: 2018-01-01 | Stop reason: HOSPADM

## 2018-01-01 RX ORDER — ONDANSETRON 2 MG/ML
INJECTION INTRAMUSCULAR; INTRAVENOUS AS NEEDED
Status: DISCONTINUED | OUTPATIENT
Start: 2018-01-01 | End: 2018-01-01 | Stop reason: SURG

## 2018-01-01 RX ORDER — IPRATROPIUM BROMIDE AND ALBUTEROL SULFATE 2.5; .5 MG/3ML; MG/3ML
3 SOLUTION RESPIRATORY (INHALATION) EVERY 4 HOURS PRN
Status: DISCONTINUED | OUTPATIENT
Start: 2018-01-01 | End: 2018-01-01

## 2018-01-01 RX ORDER — FLUMAZENIL 0.1 MG/ML
0.2 INJECTION INTRAVENOUS AS NEEDED
Status: DISCONTINUED | OUTPATIENT
Start: 2018-01-01 | End: 2018-01-01 | Stop reason: HOSPADM

## 2018-01-01 RX ORDER — METHYLPREDNISOLONE SODIUM SUCCINATE 125 MG/2ML
125 INJECTION, POWDER, LYOPHILIZED, FOR SOLUTION INTRAMUSCULAR; INTRAVENOUS EVERY 6 HOURS
Status: DISCONTINUED | OUTPATIENT
Start: 2018-01-01 | End: 2018-01-01

## 2018-01-01 RX ORDER — LIDOCAINE HYDROCHLORIDE 20 MG/ML
5 INJECTION, SOLUTION INFILTRATION; PERINEURAL ONCE
Status: COMPLETED | OUTPATIENT
Start: 2018-01-01 | End: 2018-01-01

## 2018-01-01 RX ORDER — BISACODYL 5 MG/1
10 TABLET, DELAYED RELEASE ORAL DAILY PRN
Status: DISCONTINUED | OUTPATIENT
Start: 2018-01-01 | End: 2018-07-09 | Stop reason: HOSPADM

## 2018-01-01 RX ORDER — PROPOFOL 10 MG/ML
VIAL (ML) INTRAVENOUS AS NEEDED
Status: DISCONTINUED | OUTPATIENT
Start: 2018-01-01 | End: 2018-01-01 | Stop reason: SURG

## 2018-01-01 RX ORDER — SCOLOPAMINE TRANSDERMAL SYSTEM 1 MG/1
1 PATCH, EXTENDED RELEASE TRANSDERMAL
Status: DISCONTINUED | OUTPATIENT
Start: 2018-01-01 | End: 2018-01-01 | Stop reason: SDUPTHER

## 2018-01-01 RX ORDER — OXYCODONE AND ACETAMINOPHEN 10; 325 MG/1; MG/1
1 TABLET ORAL ONCE AS NEEDED
Status: DISCONTINUED | OUTPATIENT
Start: 2018-01-01 | End: 2018-01-01 | Stop reason: HOSPADM

## 2018-01-01 RX ORDER — ROCURONIUM BROMIDE 10 MG/ML
INJECTION, SOLUTION INTRAVENOUS AS NEEDED
Status: DISCONTINUED | OUTPATIENT
Start: 2018-01-01 | End: 2018-01-01 | Stop reason: SURG

## 2018-01-01 RX ORDER — MAGNESIUM CARB/ALUMINUM HYDROX 105-160MG
150 TABLET,CHEWABLE ORAL ONCE
Status: DISCONTINUED | OUTPATIENT
Start: 2018-01-01 | End: 2018-01-01 | Stop reason: SDDI

## 2018-01-01 RX ORDER — IPRATROPIUM BROMIDE AND ALBUTEROL SULFATE 2.5; .5 MG/3ML; MG/3ML
3 SOLUTION RESPIRATORY (INHALATION) EVERY 4 HOURS PRN
Status: ACTIVE | OUTPATIENT
Start: 2018-01-01 | End: 2018-01-01

## 2018-01-01 RX ORDER — IPRATROPIUM BROMIDE AND ALBUTEROL SULFATE 2.5; .5 MG/3ML; MG/3ML
3 SOLUTION RESPIRATORY (INHALATION)
Status: DISCONTINUED | OUTPATIENT
Start: 2018-01-01 | End: 2018-01-01

## 2018-01-01 RX ORDER — SODIUM CHLORIDE 0.9 % (FLUSH) 0.9 %
1-10 SYRINGE (ML) INJECTION AS NEEDED
Status: DISCONTINUED | OUTPATIENT
Start: 2018-01-01 | End: 2018-01-01 | Stop reason: HOSPADM

## 2018-01-01 RX ORDER — HYDRALAZINE HYDROCHLORIDE 20 MG/ML
5 INJECTION INTRAMUSCULAR; INTRAVENOUS
Status: DISCONTINUED | OUTPATIENT
Start: 2018-01-01 | End: 2018-01-01 | Stop reason: HOSPADM

## 2018-01-01 RX ORDER — MORPHINE SULFATE 2 MG/ML
1 INJECTION, SOLUTION INTRAMUSCULAR; INTRAVENOUS
Status: DISCONTINUED | OUTPATIENT
Start: 2018-01-01 | End: 2018-01-01

## 2018-01-01 RX ORDER — LORAZEPAM 2 MG/ML
0.5 INJECTION INTRAMUSCULAR
Status: DISCONTINUED | OUTPATIENT
Start: 2018-01-01 | End: 2018-01-01

## 2018-01-01 RX ORDER — BISACODYL 10 MG
10 SUPPOSITORY, RECTAL RECTAL DAILY
Status: DISCONTINUED | OUTPATIENT
Start: 2018-01-01 | End: 2018-01-01

## 2018-01-01 RX ORDER — DIAPER,BRIEF,INFANT-TODD,DISP
EACH MISCELLANEOUS EVERY 12 HOURS SCHEDULED
Status: DISCONTINUED | OUTPATIENT
Start: 2018-01-01 | End: 2018-01-01

## 2018-01-01 RX ORDER — ATORVASTATIN CALCIUM 10 MG/1
10 TABLET, FILM COATED ORAL DAILY
Status: DISCONTINUED | OUTPATIENT
Start: 2018-01-01 | End: 2018-01-01

## 2018-01-01 RX ADMIN — NYSTATIN 1 APPLICATION: 100000 POWDER TOPICAL at 01:20

## 2018-01-01 RX ADMIN — ENOXAPARIN SODIUM 40 MG: 40 INJECTION SUBCUTANEOUS at 08:36

## 2018-01-01 RX ADMIN — NYSTATIN: 100000 POWDER TOPICAL at 06:20

## 2018-01-01 RX ADMIN — ENOXAPARIN SODIUM 40 MG: 40 INJECTION SUBCUTANEOUS at 10:38

## 2018-01-01 RX ADMIN — HYDROCORTISONE 1 APPLICATION: 10 CREAM TOPICAL at 20:19

## 2018-01-01 RX ADMIN — IPRATROPIUM BROMIDE AND ALBUTEROL SULFATE 3 ML: 2.5; .5 SOLUTION RESPIRATORY (INHALATION) at 15:23

## 2018-01-01 RX ADMIN — METHYLPREDNISOLONE SODIUM SUCCINATE 40 MG: 40 INJECTION, POWDER, FOR SOLUTION INTRAMUSCULAR; INTRAVENOUS at 21:31

## 2018-01-01 RX ADMIN — DOCUSATE SODIUM 100 MG: 100 CAPSULE ORAL at 21:47

## 2018-01-01 RX ADMIN — CEFTRIAXONE SODIUM 1 G: 1 INJECTION, POWDER, FOR SOLUTION INTRAMUSCULAR; INTRAVENOUS at 15:47

## 2018-01-01 RX ADMIN — IPRATROPIUM BROMIDE AND ALBUTEROL SULFATE 3 ML: 2.5; .5 SOLUTION RESPIRATORY (INHALATION) at 23:21

## 2018-01-01 RX ADMIN — DEXAMETHASONE SODIUM PHOSPHATE 4 MG: 4 INJECTION, SOLUTION INTRAMUSCULAR; INTRAVENOUS at 08:51

## 2018-01-01 RX ADMIN — ENOXAPARIN SODIUM 40 MG: 40 INJECTION SUBCUTANEOUS at 08:13

## 2018-01-01 RX ADMIN — DOCUSATE SODIUM 100 MG: 100 CAPSULE ORAL at 08:36

## 2018-01-01 RX ADMIN — HYDROCORTISONE: 10 CREAM TOPICAL at 08:56

## 2018-01-01 RX ADMIN — IPRATROPIUM BROMIDE AND ALBUTEROL SULFATE 3 ML: 2.5; .5 SOLUTION RESPIRATORY (INHALATION) at 18:25

## 2018-01-01 RX ADMIN — LORAZEPAM 0.5 MG: 2 INJECTION INTRAMUSCULAR; INTRAVENOUS at 22:39

## 2018-01-01 RX ADMIN — IPRATROPIUM BROMIDE AND ALBUTEROL SULFATE 3 ML: 2.5; .5 SOLUTION RESPIRATORY (INHALATION) at 15:13

## 2018-01-01 RX ADMIN — DEXAMETHASONE SODIUM PHOSPHATE 4 MG: 4 INJECTION, SOLUTION INTRAMUSCULAR; INTRAVENOUS at 10:33

## 2018-01-01 RX ADMIN — NYSTATIN: 100000 POWDER TOPICAL at 21:23

## 2018-01-01 RX ADMIN — BISACODYL 10 MG: 10 SUPPOSITORY RECTAL at 08:56

## 2018-01-01 RX ADMIN — ENOXAPARIN SODIUM 40 MG: 40 INJECTION SUBCUTANEOUS at 08:27

## 2018-01-01 RX ADMIN — DOCUSATE SODIUM 100 MG: 100 CAPSULE ORAL at 08:27

## 2018-01-01 RX ADMIN — IPRATROPIUM BROMIDE AND ALBUTEROL SULFATE 3 ML: 2.5; .5 SOLUTION RESPIRATORY (INHALATION) at 00:02

## 2018-01-01 RX ADMIN — IPRATROPIUM BROMIDE AND ALBUTEROL SULFATE 3 ML: 2.5; .5 SOLUTION RESPIRATORY (INHALATION) at 11:24

## 2018-01-01 RX ADMIN — IPRATROPIUM BROMIDE AND ALBUTEROL SULFATE 3 ML: 2.5; .5 SOLUTION RESPIRATORY (INHALATION) at 11:05

## 2018-01-01 RX ADMIN — DEXAMETHASONE SODIUM PHOSPHATE 4 MG: 4 INJECTION, SOLUTION INTRAMUSCULAR; INTRAVENOUS at 17:05

## 2018-01-01 RX ADMIN — IPRATROPIUM BROMIDE AND ALBUTEROL SULFATE 3 ML: 2.5; .5 SOLUTION RESPIRATORY (INHALATION) at 11:15

## 2018-01-01 RX ADMIN — IPRATROPIUM BROMIDE AND ALBUTEROL SULFATE 3 ML: 2.5; .5 SOLUTION RESPIRATORY (INHALATION) at 07:16

## 2018-01-01 RX ADMIN — NYSTATIN: 100000 POWDER TOPICAL at 21:15

## 2018-01-01 RX ADMIN — METHYLPREDNISOLONE SODIUM SUCCINATE 125 MG: 125 INJECTION, POWDER, FOR SOLUTION INTRAMUSCULAR; INTRAVENOUS at 20:55

## 2018-01-01 RX ADMIN — SUCCINYLCHOLINE CHLORIDE 120 MG: 20 INJECTION, SOLUTION INTRAMUSCULAR; INTRAVENOUS at 13:52

## 2018-01-01 RX ADMIN — DEXAMETHASONE SODIUM PHOSPHATE 4 MG: 4 INJECTION, SOLUTION INTRAMUSCULAR; INTRAVENOUS at 17:55

## 2018-01-01 RX ADMIN — ENOXAPARIN SODIUM 40 MG: 40 INJECTION SUBCUTANEOUS at 12:10

## 2018-01-01 RX ADMIN — NYSTATIN 1 APPLICATION: 100000 POWDER TOPICAL at 21:14

## 2018-01-01 RX ADMIN — METHYLPREDNISOLONE SODIUM SUCCINATE 125 MG: 125 INJECTION, POWDER, FOR SOLUTION INTRAMUSCULAR; INTRAVENOUS at 08:57

## 2018-01-01 RX ADMIN — IPRATROPIUM BROMIDE AND ALBUTEROL SULFATE 3 ML: 2.5; .5 SOLUTION RESPIRATORY (INHALATION) at 15:25

## 2018-01-01 RX ADMIN — IPRATROPIUM BROMIDE AND ALBUTEROL SULFATE 3 ML: 2.5; .5 SOLUTION RESPIRATORY (INHALATION) at 23:14

## 2018-01-01 RX ADMIN — NYSTATIN: 100000 POWDER TOPICAL at 21:28

## 2018-01-01 RX ADMIN — IPRATROPIUM BROMIDE AND ALBUTEROL SULFATE 3 ML: 2.5; .5 SOLUTION RESPIRATORY (INHALATION) at 20:26

## 2018-01-01 RX ADMIN — NYSTATIN 1 APPLICATION: 100000 POWDER TOPICAL at 21:01

## 2018-01-01 RX ADMIN — GADOBENATE DIMEGLUMINE 20 ML: 529 INJECTION, SOLUTION INTRAVENOUS at 09:50

## 2018-01-01 RX ADMIN — HYDROCORTISONE: 10 CREAM TOPICAL at 09:30

## 2018-01-01 RX ADMIN — IPRATROPIUM BROMIDE AND ALBUTEROL SULFATE 3 ML: 2.5; .5 SOLUTION RESPIRATORY (INHALATION) at 20:17

## 2018-01-01 RX ADMIN — HYDROCORTISONE: 10 CREAM TOPICAL at 12:12

## 2018-01-01 RX ADMIN — FLUCONAZOLE 100 MG: 100 TABLET ORAL at 08:56

## 2018-01-01 RX ADMIN — DEXAMETHASONE SODIUM PHOSPHATE 4 MG: 4 INJECTION, SOLUTION INTRAMUSCULAR; INTRAVENOUS at 00:32

## 2018-01-01 RX ADMIN — IPRATROPIUM BROMIDE AND ALBUTEROL SULFATE 3 ML: 2.5; .5 SOLUTION RESPIRATORY (INHALATION) at 07:43

## 2018-01-01 RX ADMIN — NYSTATIN: 100000 POWDER TOPICAL at 21:13

## 2018-01-01 RX ADMIN — NYSTATIN: 100000 POWDER TOPICAL at 17:27

## 2018-01-01 RX ADMIN — IPRATROPIUM BROMIDE AND ALBUTEROL SULFATE 3 ML: 2.5; .5 SOLUTION RESPIRATORY (INHALATION) at 11:40

## 2018-01-01 RX ADMIN — IPRATROPIUM BROMIDE AND ALBUTEROL SULFATE 3 ML: 2.5; .5 SOLUTION RESPIRATORY (INHALATION) at 07:53

## 2018-01-01 RX ADMIN — DEXAMETHASONE SODIUM PHOSPHATE 4 MG: 4 INJECTION, SOLUTION INTRAMUSCULAR; INTRAVENOUS at 17:28

## 2018-01-01 RX ADMIN — NYSTATIN 1 APPLICATION: 100000 POWDER TOPICAL at 06:22

## 2018-01-01 RX ADMIN — LIDOCAINE HYDROCHLORIDE 5 ML: 20 INJECTION, SOLUTION INFILTRATION; PERINEURAL at 13:24

## 2018-01-01 RX ADMIN — IPRATROPIUM BROMIDE AND ALBUTEROL SULFATE 3 ML: 2.5; .5 SOLUTION RESPIRATORY (INHALATION) at 15:00

## 2018-01-01 RX ADMIN — IPRATROPIUM BROMIDE AND ALBUTEROL SULFATE 3 ML: 2.5; .5 SOLUTION RESPIRATORY (INHALATION) at 15:55

## 2018-01-01 RX ADMIN — BISACODYL 10 MG: 5 TABLET ORAL at 20:20

## 2018-01-01 RX ADMIN — FLUCONAZOLE 100 MG: 100 TABLET ORAL at 08:13

## 2018-01-01 RX ADMIN — IPRATROPIUM BROMIDE AND ALBUTEROL SULFATE 3 ML: 2.5; .5 SOLUTION RESPIRATORY (INHALATION) at 06:35

## 2018-01-01 RX ADMIN — NYSTATIN 1 APPLICATION: 100000 POWDER TOPICAL at 22:05

## 2018-01-01 RX ADMIN — IPRATROPIUM BROMIDE AND ALBUTEROL SULFATE 3 ML: 2.5; .5 SOLUTION RESPIRATORY (INHALATION) at 14:22

## 2018-01-01 RX ADMIN — IPRATROPIUM BROMIDE AND ALBUTEROL SULFATE 3 ML: 2.5; .5 SOLUTION RESPIRATORY (INHALATION) at 15:07

## 2018-01-01 RX ADMIN — HYDROCORTISONE: 10 CREAM TOPICAL at 21:13

## 2018-01-01 RX ADMIN — NYSTATIN: 100000 POWDER TOPICAL at 05:47

## 2018-01-01 RX ADMIN — IPRATROPIUM BROMIDE AND ALBUTEROL SULFATE 3 ML: 2.5; .5 SOLUTION RESPIRATORY (INHALATION) at 11:58

## 2018-01-01 RX ADMIN — ENOXAPARIN SODIUM 40 MG: 40 INJECTION SUBCUTANEOUS at 09:29

## 2018-01-01 RX ADMIN — IPRATROPIUM BROMIDE AND ALBUTEROL SULFATE 3 ML: 2.5; .5 SOLUTION RESPIRATORY (INHALATION) at 07:15

## 2018-01-01 RX ADMIN — ATORVASTATIN CALCIUM 10 MG: 10 TABLET, FILM COATED ORAL at 08:56

## 2018-01-01 RX ADMIN — IPRATROPIUM BROMIDE AND ALBUTEROL SULFATE 3 ML: 2.5; .5 SOLUTION RESPIRATORY (INHALATION) at 03:53

## 2018-01-01 RX ADMIN — IPRATROPIUM BROMIDE AND ALBUTEROL SULFATE 3 ML: 2.5; .5 SOLUTION RESPIRATORY (INHALATION) at 11:11

## 2018-01-01 RX ADMIN — IPRATROPIUM BROMIDE AND ALBUTEROL SULFATE 3 ML: 2.5; .5 SOLUTION RESPIRATORY (INHALATION) at 03:23

## 2018-01-01 RX ADMIN — IPRATROPIUM BROMIDE AND ALBUTEROL SULFATE 3 ML: 2.5; .5 SOLUTION RESPIRATORY (INHALATION) at 11:34

## 2018-01-01 RX ADMIN — NYSTATIN: 100000 POWDER TOPICAL at 21:02

## 2018-01-01 RX ADMIN — NYSTATIN: 100000 POWDER TOPICAL at 05:41

## 2018-01-01 RX ADMIN — IPRATROPIUM BROMIDE AND ALBUTEROL SULFATE 3 ML: 2.5; .5 SOLUTION RESPIRATORY (INHALATION) at 11:21

## 2018-01-01 RX ADMIN — ENOXAPARIN SODIUM 40 MG: 40 INJECTION SUBCUTANEOUS at 08:47

## 2018-01-01 RX ADMIN — IPRATROPIUM BROMIDE AND ALBUTEROL SULFATE 3 ML: 2.5; .5 SOLUTION RESPIRATORY (INHALATION) at 07:37

## 2018-01-01 RX ADMIN — DEXAMETHASONE SODIUM PHOSPHATE 4 MG: 4 INJECTION, SOLUTION INTRAMUSCULAR; INTRAVENOUS at 14:32

## 2018-01-01 RX ADMIN — NYSTATIN: 100000 POWDER TOPICAL at 14:00

## 2018-01-01 RX ADMIN — NYSTATIN 1 APPLICATION: 100000 POWDER TOPICAL at 20:19

## 2018-01-01 RX ADMIN — ONDANSETRON HYDROCHLORIDE 4 MG: 2 SOLUTION INTRAMUSCULAR; INTRAVENOUS at 14:00

## 2018-01-01 RX ADMIN — SCOPOLAMINE 2 PATCH: 1 PATCH, EXTENDED RELEASE TRANSDERMAL at 19:57

## 2018-01-01 RX ADMIN — IPRATROPIUM BROMIDE AND ALBUTEROL SULFATE 3 ML: 2.5; .5 SOLUTION RESPIRATORY (INHALATION) at 11:38

## 2018-01-01 RX ADMIN — ENOXAPARIN SODIUM 40 MG: 40 INJECTION SUBCUTANEOUS at 08:56

## 2018-01-01 RX ADMIN — IPRATROPIUM BROMIDE AND ALBUTEROL SULFATE 3 ML: 2.5; .5 SOLUTION RESPIRATORY (INHALATION) at 19:44

## 2018-01-01 RX ADMIN — IPRATROPIUM BROMIDE AND ALBUTEROL SULFATE 3 ML: 2.5; .5 SOLUTION RESPIRATORY (INHALATION) at 21:07

## 2018-01-01 RX ADMIN — NYSTATIN: 100000 POWDER TOPICAL at 06:32

## 2018-01-01 RX ADMIN — CEFTRIAXONE SODIUM 1 G: 1 INJECTION, POWDER, FOR SOLUTION INTRAMUSCULAR; INTRAVENOUS at 17:55

## 2018-01-01 RX ADMIN — CEFTRIAXONE SODIUM 1 G: 1 INJECTION, POWDER, FOR SOLUTION INTRAMUSCULAR; INTRAVENOUS at 17:05

## 2018-01-01 RX ADMIN — IPRATROPIUM BROMIDE AND ALBUTEROL SULFATE 3 ML: 2.5; .5 SOLUTION RESPIRATORY (INHALATION) at 11:41

## 2018-01-01 RX ADMIN — DEXAMETHASONE SODIUM PHOSPHATE 4 MG: 4 INJECTION, SOLUTION INTRAMUSCULAR; INTRAVENOUS at 12:17

## 2018-01-01 RX ADMIN — IPRATROPIUM BROMIDE AND ALBUTEROL SULFATE 3 ML: 2.5; .5 SOLUTION RESPIRATORY (INHALATION) at 19:16

## 2018-01-01 RX ADMIN — IPRATROPIUM BROMIDE AND ALBUTEROL SULFATE 3 ML: 2.5; .5 SOLUTION RESPIRATORY (INHALATION) at 06:48

## 2018-01-01 RX ADMIN — NYSTATIN: 100000 POWDER TOPICAL at 14:56

## 2018-01-01 RX ADMIN — DOCUSATE SODIUM 100 MG: 100 CAPSULE ORAL at 21:02

## 2018-01-01 RX ADMIN — IPRATROPIUM BROMIDE AND ALBUTEROL SULFATE 3 ML: 2.5; .5 SOLUTION RESPIRATORY (INHALATION) at 07:50

## 2018-01-01 RX ADMIN — DEXAMETHASONE SODIUM PHOSPHATE 4 MG: 4 INJECTION, SOLUTION INTRAMUSCULAR; INTRAVENOUS at 02:29

## 2018-01-01 RX ADMIN — DOCUSATE SODIUM 100 MG: 100 CAPSULE ORAL at 21:28

## 2018-01-01 RX ADMIN — METHYLPREDNISOLONE SODIUM SUCCINATE 125 MG: 125 INJECTION, POWDER, FOR SOLUTION INTRAMUSCULAR; INTRAVENOUS at 04:43

## 2018-01-01 RX ADMIN — SODIUM CHLORIDE, POTASSIUM CHLORIDE, SODIUM LACTATE AND CALCIUM CHLORIDE 100 ML/HR: 600; 310; 30; 20 INJECTION, SOLUTION INTRAVENOUS at 13:28

## 2018-01-01 RX ADMIN — IPRATROPIUM BROMIDE AND ALBUTEROL SULFATE 3 ML: 2.5; .5 SOLUTION RESPIRATORY (INHALATION) at 15:18

## 2018-01-01 RX ADMIN — IPRATROPIUM BROMIDE AND ALBUTEROL SULFATE 3 ML: 2.5; .5 SOLUTION RESPIRATORY (INHALATION) at 02:53

## 2018-01-01 RX ADMIN — DOCUSATE SODIUM 100 MG: 100 CAPSULE ORAL at 20:21

## 2018-01-01 RX ADMIN — ATORVASTATIN CALCIUM 10 MG: 10 TABLET, FILM COATED ORAL at 09:01

## 2018-01-01 RX ADMIN — NYSTATIN: 100000 POWDER TOPICAL at 06:13

## 2018-01-01 RX ADMIN — ENOXAPARIN SODIUM 40 MG: 40 INJECTION SUBCUTANEOUS at 08:48

## 2018-01-01 RX ADMIN — NYSTATIN: 100000 POWDER TOPICAL at 21:50

## 2018-01-01 RX ADMIN — ATORVASTATIN CALCIUM 10 MG: 10 TABLET, FILM COATED ORAL at 08:47

## 2018-01-01 RX ADMIN — SCOPOLAMINE 2 PATCH: 1 PATCH, EXTENDED RELEASE TRANSDERMAL at 18:07

## 2018-01-01 RX ADMIN — ATORVASTATIN CALCIUM 10 MG: 10 TABLET, FILM COATED ORAL at 08:50

## 2018-01-01 RX ADMIN — HYDROCORTISONE: 10 CREAM TOPICAL at 08:24

## 2018-01-01 RX ADMIN — NYSTATIN 1 APPLICATION: 100000 POWDER TOPICAL at 21:29

## 2018-01-01 RX ADMIN — FLUDEOXYGLUCOSE F18 1 DOSE: 300 INJECTION INTRAVENOUS at 14:31

## 2018-01-01 RX ADMIN — ENOXAPARIN SODIUM 40 MG: 40 INJECTION, SOLUTION INTRAVENOUS; SUBCUTANEOUS at 08:26

## 2018-01-01 RX ADMIN — IPRATROPIUM BROMIDE AND ALBUTEROL SULFATE 3 ML: 2.5; .5 SOLUTION RESPIRATORY (INHALATION) at 15:10

## 2018-01-01 RX ADMIN — NYSTATIN 1 APPLICATION: 100000 POWDER TOPICAL at 21:27

## 2018-01-01 RX ADMIN — AZITHROMYCIN MONOHYDRATE 500 MG: 500 INJECTION, POWDER, LYOPHILIZED, FOR SOLUTION INTRAVENOUS at 17:55

## 2018-01-01 RX ADMIN — ROCURONIUM BROMIDE 10 MG: 10 INJECTION INTRAVENOUS at 13:51

## 2018-01-01 RX ADMIN — FLUCONAZOLE 100 MG: 100 TABLET ORAL at 08:36

## 2018-01-01 RX ADMIN — NYSTATIN 1 APPLICATION: 100000 POWDER TOPICAL at 14:44

## 2018-01-01 RX ADMIN — IPRATROPIUM BROMIDE AND ALBUTEROL SULFATE 3 ML: 2.5; .5 SOLUTION RESPIRATORY (INHALATION) at 14:39

## 2018-01-01 RX ADMIN — NYSTATIN: 100000 POWDER TOPICAL at 12:11

## 2018-01-01 RX ADMIN — HYDROCORTISONE: 10 CREAM TOPICAL at 08:36

## 2018-01-01 RX ADMIN — NYSTATIN: 100000 POWDER TOPICAL at 12:18

## 2018-01-01 RX ADMIN — DEXAMETHASONE SODIUM PHOSPHATE 4 MG: 4 INJECTION, SOLUTION INTRAMUSCULAR; INTRAVENOUS at 10:38

## 2018-01-01 RX ADMIN — IPRATROPIUM BROMIDE AND ALBUTEROL SULFATE 3 ML: 2.5; .5 SOLUTION RESPIRATORY (INHALATION) at 22:29

## 2018-01-01 RX ADMIN — NYSTATIN: 100000 POWDER TOPICAL at 06:00

## 2018-01-01 RX ADMIN — IPRATROPIUM BROMIDE AND ALBUTEROL SULFATE 3 ML: 2.5; .5 SOLUTION RESPIRATORY (INHALATION) at 19:59

## 2018-01-01 RX ADMIN — NYSTATIN: 100000 POWDER TOPICAL at 08:48

## 2018-01-01 RX ADMIN — DEXAMETHASONE SODIUM PHOSPHATE 4 MG: 4 INJECTION, SOLUTION INTRAMUSCULAR; INTRAVENOUS at 17:16

## 2018-01-01 RX ADMIN — IPRATROPIUM BROMIDE AND ALBUTEROL SULFATE 3 ML: 2.5; .5 SOLUTION RESPIRATORY (INHALATION) at 15:42

## 2018-01-01 RX ADMIN — METHYLPREDNISOLONE SODIUM SUCCINATE 125 MG: 125 INJECTION, POWDER, FOR SOLUTION INTRAMUSCULAR; INTRAVENOUS at 09:11

## 2018-01-01 RX ADMIN — BISACODYL 10 MG: 10 SUPPOSITORY RECTAL at 08:23

## 2018-01-01 RX ADMIN — IPRATROPIUM BROMIDE AND ALBUTEROL SULFATE 3 ML: 2.5; .5 SOLUTION RESPIRATORY (INHALATION) at 11:32

## 2018-01-01 RX ADMIN — DOCUSATE SODIUM 100 MG: 100 CAPSULE ORAL at 08:13

## 2018-01-01 RX ADMIN — Medication 296 ML: at 21:27

## 2018-01-01 RX ADMIN — NYSTATIN 1 APPLICATION: 100000 POWDER TOPICAL at 21:47

## 2018-01-01 RX ADMIN — METHYLPREDNISOLONE SODIUM SUCCINATE 40 MG: 40 INJECTION, POWDER, FOR SOLUTION INTRAMUSCULAR; INTRAVENOUS at 08:39

## 2018-01-01 RX ADMIN — ATORVASTATIN CALCIUM 10 MG: 10 TABLET, FILM COATED ORAL at 08:13

## 2018-01-01 RX ADMIN — ENOXAPARIN SODIUM 40 MG: 40 INJECTION SUBCUTANEOUS at 08:23

## 2018-01-01 RX ADMIN — IPRATROPIUM BROMIDE AND ALBUTEROL SULFATE 3 ML: 2.5; .5 SOLUTION RESPIRATORY (INHALATION) at 18:59

## 2018-01-01 RX ADMIN — IPRATROPIUM BROMIDE AND ALBUTEROL SULFATE 3 ML: 2.5; .5 SOLUTION RESPIRATORY (INHALATION) at 10:46

## 2018-01-01 RX ADMIN — IPRATROPIUM BROMIDE AND ALBUTEROL SULFATE 3 ML: 2.5; .5 SOLUTION RESPIRATORY (INHALATION) at 11:36

## 2018-01-01 RX ADMIN — CLONIDINE 1 PATCH: 0.1 PATCH TRANSDERMAL at 09:30

## 2018-01-01 RX ADMIN — IPRATROPIUM BROMIDE AND ALBUTEROL SULFATE 3 ML: 2.5; .5 SOLUTION RESPIRATORY (INHALATION) at 03:25

## 2018-01-01 RX ADMIN — BISACODYL 10 MG: 10 SUPPOSITORY RECTAL at 21:50

## 2018-01-01 RX ADMIN — CLONIDINE 1 PATCH: 0.1 PATCH TRANSDERMAL at 12:45

## 2018-01-01 RX ADMIN — AZITHROMYCIN MONOHYDRATE 500 MG: 500 INJECTION, POWDER, LYOPHILIZED, FOR SOLUTION INTRAVENOUS at 15:48

## 2018-01-01 RX ADMIN — ATORVASTATIN CALCIUM 10 MG: 10 TABLET, FILM COATED ORAL at 08:26

## 2018-01-01 RX ADMIN — IPRATROPIUM BROMIDE AND ALBUTEROL SULFATE 3 ML: 2.5; .5 SOLUTION RESPIRATORY (INHALATION) at 14:43

## 2018-01-01 RX ADMIN — IPRATROPIUM BROMIDE AND ALBUTEROL SULFATE 3 ML: 2.5; .5 SOLUTION RESPIRATORY (INHALATION) at 23:48

## 2018-01-01 RX ADMIN — DEXAMETHASONE SODIUM PHOSPHATE 4 MG: 4 INJECTION, SOLUTION INTRAMUSCULAR; INTRAVENOUS at 06:20

## 2018-01-01 RX ADMIN — DEXAMETHASONE SODIUM PHOSPHATE 4 MG: 4 INJECTION, SOLUTION INTRAMUSCULAR; INTRAVENOUS at 23:45

## 2018-01-01 RX ADMIN — IPRATROPIUM BROMIDE AND ALBUTEROL SULFATE 3 ML: 2.5; .5 SOLUTION RESPIRATORY (INHALATION) at 20:15

## 2018-01-01 RX ADMIN — FENTANYL CITRATE 100 MCG: 50 INJECTION, SOLUTION INTRAMUSCULAR; INTRAVENOUS at 13:50

## 2018-01-01 RX ADMIN — IOPAMIDOL 100 ML: 612 INJECTION, SOLUTION INTRAVENOUS at 14:31

## 2018-01-01 RX ADMIN — NYSTATIN: 100000 POWDER TOPICAL at 07:07

## 2018-01-01 RX ADMIN — IPRATROPIUM BROMIDE AND ALBUTEROL SULFATE 3 ML: 2.5; .5 SOLUTION RESPIRATORY (INHALATION) at 19:30

## 2018-01-01 RX ADMIN — ATORVASTATIN CALCIUM 10 MG: 10 TABLET, FILM COATED ORAL at 08:02

## 2018-01-01 RX ADMIN — NYSTATIN: 100000 POWDER TOPICAL at 06:14

## 2018-01-01 RX ADMIN — CEFTRIAXONE SODIUM 1 G: 1 INJECTION, POWDER, FOR SOLUTION INTRAMUSCULAR; INTRAVENOUS at 17:28

## 2018-01-01 RX ADMIN — LORAZEPAM 0.5 MG: 2 INJECTION INTRAMUSCULAR; INTRAVENOUS at 20:55

## 2018-01-01 RX ADMIN — IPRATROPIUM BROMIDE AND ALBUTEROL SULFATE 3 ML: 2.5; .5 SOLUTION RESPIRATORY (INHALATION) at 07:08

## 2018-01-01 RX ADMIN — ATORVASTATIN CALCIUM 10 MG: 10 TABLET, FILM COATED ORAL at 08:27

## 2018-01-01 RX ADMIN — IPRATROPIUM BROMIDE AND ALBUTEROL SULFATE 3 ML: 2.5; .5 SOLUTION RESPIRATORY (INHALATION) at 03:45

## 2018-01-01 RX ADMIN — PROPOFOL 150 MG: 10 INJECTION, EMULSION INTRAVENOUS at 13:52

## 2018-01-01 RX ADMIN — ATORVASTATIN CALCIUM 10 MG: 10 TABLET, FILM COATED ORAL at 09:00

## 2018-01-01 RX ADMIN — IPRATROPIUM BROMIDE AND ALBUTEROL SULFATE 3 ML: 2.5; .5 SOLUTION RESPIRATORY (INHALATION) at 21:09

## 2018-01-01 RX ADMIN — ATORVASTATIN CALCIUM 10 MG: 10 TABLET, FILM COATED ORAL at 12:10

## 2018-01-01 RX ADMIN — IPRATROPIUM BROMIDE AND ALBUTEROL SULFATE 3 ML: 2.5; .5 SOLUTION RESPIRATORY (INHALATION) at 07:29

## 2018-01-01 RX ADMIN — DOCUSATE SODIUM 100 MG: 100 CAPSULE ORAL at 21:50

## 2018-01-01 RX ADMIN — NYSTATIN: 100000 POWDER TOPICAL at 14:23

## 2018-01-01 RX ADMIN — HYDROCORTISONE 1 APPLICATION: 10 CREAM TOPICAL at 20:55

## 2018-01-01 RX ADMIN — IPRATROPIUM BROMIDE AND ALBUTEROL SULFATE 3 ML: 2.5; .5 SOLUTION RESPIRATORY (INHALATION) at 19:19

## 2018-01-01 RX ADMIN — DOCUSATE SODIUM 100 MG: 100 CAPSULE ORAL at 21:23

## 2018-01-01 RX ADMIN — ATORVASTATIN CALCIUM 10 MG: 10 TABLET, FILM COATED ORAL at 18:21

## 2018-01-01 RX ADMIN — HYDROCORTISONE: 10 CREAM TOPICAL at 21:02

## 2018-01-01 RX ADMIN — DEXAMETHASONE SODIUM PHOSPHATE 4 MG: 4 INJECTION, SOLUTION INTRAMUSCULAR; INTRAVENOUS at 00:44

## 2018-01-01 RX ADMIN — CEFTRIAXONE SODIUM 1 G: 1 INJECTION, POWDER, FOR SOLUTION INTRAMUSCULAR; INTRAVENOUS at 17:54

## 2018-01-01 RX ADMIN — FLUCONAZOLE 100 MG: 100 TABLET ORAL at 11:56

## 2018-01-01 RX ADMIN — DOCUSATE SODIUM 100 MG: 100 CAPSULE ORAL at 08:50

## 2018-01-01 RX ADMIN — HYDROCORTISONE: 10 CREAM TOPICAL at 13:15

## 2018-01-01 RX ADMIN — HYDROCORTISONE 1 APPLICATION: 10 CREAM TOPICAL at 21:15

## 2018-01-01 RX ADMIN — DEXAMETHASONE SODIUM PHOSPHATE 4 MG: 4 INJECTION, SOLUTION INTRAMUSCULAR; INTRAVENOUS at 17:54

## 2018-01-01 RX ADMIN — IPRATROPIUM BROMIDE AND ALBUTEROL SULFATE 3 ML: 2.5; .5 SOLUTION RESPIRATORY (INHALATION) at 20:22

## 2018-01-01 RX ADMIN — HYDROCORTISONE: 10 CREAM TOPICAL at 08:31

## 2018-01-01 RX ADMIN — ATORVASTATIN CALCIUM 10 MG: 10 TABLET, FILM COATED ORAL at 10:37

## 2018-01-01 RX ADMIN — HYDROCORTISONE: 10 CREAM TOPICAL at 22:05

## 2018-01-01 RX ADMIN — METHYLPREDNISOLONE SODIUM SUCCINATE 40 MG: 40 INJECTION, POWDER, FOR SOLUTION INTRAMUSCULAR; INTRAVENOUS at 02:47

## 2018-01-01 RX ADMIN — ENOXAPARIN SODIUM 40 MG: 40 INJECTION, SOLUTION INTRAVENOUS; SUBCUTANEOUS at 12:41

## 2018-01-01 RX ADMIN — NYSTATIN: 100000 POWDER TOPICAL at 08:36

## 2018-01-01 RX ADMIN — ENOXAPARIN SODIUM 40 MG: 40 INJECTION SUBCUTANEOUS at 08:31

## 2018-01-01 RX ADMIN — NYSTATIN: 100000 POWDER TOPICAL at 06:12

## 2018-01-01 RX ADMIN — IPRATROPIUM BROMIDE AND ALBUTEROL SULFATE 3 ML: 2.5; .5 SOLUTION RESPIRATORY (INHALATION) at 10:25

## 2018-01-01 RX ADMIN — HYDROCORTISONE: 10 CREAM TOPICAL at 21:24

## 2018-01-01 RX ADMIN — IPRATROPIUM BROMIDE AND ALBUTEROL SULFATE 3 ML: 2.5; .5 SOLUTION RESPIRATORY (INHALATION) at 15:41

## 2018-01-01 RX ADMIN — AZITHROMYCIN MONOHYDRATE 500 MG: 500 INJECTION, POWDER, LYOPHILIZED, FOR SOLUTION INTRAVENOUS at 17:05

## 2018-01-01 RX ADMIN — FLUCONAZOLE 100 MG: 100 TABLET ORAL at 08:27

## 2018-01-01 RX ADMIN — DOCUSATE SODIUM 100 MG: 100 CAPSULE ORAL at 10:33

## 2018-01-01 RX ADMIN — GADOBENATE DIMEGLUMINE 20 ML: 529 INJECTION, SOLUTION INTRAVENOUS at 12:44

## 2018-01-01 RX ADMIN — NYSTATIN: 100000 POWDER TOPICAL at 15:39

## 2018-01-01 RX ADMIN — IPRATROPIUM BROMIDE AND ALBUTEROL SULFATE 3 ML: 2.5; .5 SOLUTION RESPIRATORY (INHALATION) at 06:23

## 2018-01-01 RX ADMIN — NYSTATIN: 100000 POWDER TOPICAL at 08:39

## 2018-01-01 RX ADMIN — HYDROCORTISONE: 10 CREAM TOPICAL at 21:14

## 2018-01-01 RX ADMIN — CLONIDINE 1 PATCH: 0.1 PATCH TRANSDERMAL at 08:49

## 2018-01-01 RX ADMIN — ENOXAPARIN SODIUM 40 MG: 40 INJECTION, SOLUTION INTRAVENOUS; SUBCUTANEOUS at 08:40

## 2018-01-01 RX ADMIN — LORAZEPAM 0.5 MG: 2 INJECTION INTRAMUSCULAR; INTRAVENOUS at 01:19

## 2018-01-01 RX ADMIN — IPRATROPIUM BROMIDE AND ALBUTEROL SULFATE 3 ML: 2.5; .5 SOLUTION RESPIRATORY (INHALATION) at 14:32

## 2018-01-01 RX ADMIN — BISACODYL 10 MG: 10 SUPPOSITORY RECTAL at 15:40

## 2018-02-28 NOTE — TELEPHONE ENCOUNTER
Pt wife called the insurance will not cover the spirivia but they will  Cover the encruse inh they want to know if this is ok with manuel? [phone]

## 2018-06-07 NOTE — PROGRESS NOTES
"06/07/2018       Jake Keller MD  [address] [place] [id number]        Dipesh Pruitt  1940    Chief Complaint   Patient presents with   • Follow-up     Follow up for aortic study results.Denies symptoms.       Dear Jake Keller MD:    HPI     I had the pleasure of seeing you patient in the office today for follow up.  As you recall, the patient is a 77 y.o. male who started having some pain in his left leg. He had some lumbar x-rays revealing a possible large infrarenal abdominal aortic aneurysm. He denied any back pain or abdominal pain. He went then for a CTA of the abdomen and pelvis, which I personally reviewed, which shows an aneurysm measuring 6.7 cm. He did undergo endovascular abdominal aortic aneurysm repair on 10/19/16. He did have noninvasive testing done, which I did review as well as Dr. Rosado.         /84   Pulse 100   Ht 170.2 cm (67\")   Wt 94.3 kg (208 lb)   BMI 32.58 kg/m²   Physical Exam  Constitutional: He is oriented to person, place, and time. He appears well-developed and well-nourished. No distress.   HENT:   Head: Normocephalic and atraumatic.   Mouth/Throat: Oropharynx is clear and moist and mucous membranes are normal.   Eyes: Pupils are equal, round, and reactive to light.   Neck: Normal range of motion. Neck supple. No JVD present. Carotid bruit is not present. No thyromegaly present.   Cardiovascular: Normal rate, regular rhythm, S1 normal, S2 normal and normal heart sounds. Exam reveals no gallop and no friction rub.   No murmur heard.  Pulses:  Femoral pulses are 2+ on the right side, and 2+ on the left side.  Popliteal pulses are 2+ on the right side, and 2+ on the left side.   Dorsalis pedis pulses are 0 on the right side, and 2+ on the left side.   Posterior tibial pulses are 2+ on the right side, and 2+ on the left side.   Bilateral groins healed   Right -DP doppler   Pulmonary/Chest: Effort normal and breath sounds normal. "   Abdominal: Soft. Normal appearance, normal aorta and bowel sounds are normal. He exhibits no abdominal bruit. There is no hepatosplenomegaly. There is no tenderness.   Musculoskeletal: Normal range of motion.     Vascular Status - His exam exhibits no right foot edema. His exam exhibits no left foot edema.  Neurological: He is alert and oriented to person, place, and time. He has normal strength. No cranial nerve deficit.   Skin: Skin is warm, dry and intact. He is not diaphoretic.   Psychiatric: He has a normal mood and affect. His behavior is normal. Judgment and thought content normal. Cognition and memory are normal.   Vitals reviewed.    DIAGNOSTIC DATA:    Us Aorta Limited    Result Date: 6/7/2018  Narrative: EXAMINATION:   US AORTA LIMITED-  6/7/2018 10:11 AM CDT  HISTORY: Aortic ultrasound  Proximal aorta is 3.2 x 3.2 cm.  Mid aorta is 2.3 x 2.3 cm.  The infrarenal abdominal aorta is 6.3 x 6.2 cm. This is unchanged from December 4, 2017. Endovascular stent grafts are present      Impression: Infrarenal abdominal aorta is 6.28 x 6.2 cm. This is not significantly changed from December 4, 2017. Endovascular stent graft are present. This report was finalized on 06/07/2018 10:14 by Dr. Toribio Martin MD.      Patient Active Problem List   Diagnosis   • Abdominal aortic aneurysm without rupture   • Tobacco abuse   • Chronic obstructive pulmonary disease   • Vascular abnormality   • Mixed hyperlipidemia   • Skin cancer   • AAA (abdominal aortic aneurysm)         ICD-10-CM ICD-9-CM   1. Abdominal aortic aneurysm (AAA) without rupture I71.4 441.4   2. Mixed hyperlipidemia E78.2 272.2   3. Tobacco abuse Z72.0 305.1   4. Bilateral carotid artery stenosis I65.23 433.10     433.30       Lab Frequency Next Occurrence   Duplex Carotid Ultrasound CAR Once 9/29/2016   CT Angiogram Abdomen Pelvis With & Without Contrast Once 11/23/2016   US Aorta Limited Once 12/2/2017       PLAN: After thoroughly evaluating Dipesh Peterson  [place], I believe the best course of action is to remain conservative from a vascular standpoint.  His testing remains stable and his aneurysm is smaller than previous.  We will see Dipesh Pruitt back in 6 months with repeat noninvasive testing for continued surveillance.  I will also recheck carotid duplex at next visit.   I did discuss vascular risk factors as they pertain to the progression of vascular disease including controlling his hyperlipidemia. Body mass index is 32.58 kg/m². Follow up with PCP regarding abnormal BMI. The patient is to continue taking their medications as previously discussed.   This was all discussed in full with complete understanding.  Thank you for allowing me to participate in the care of your patient.  Please do not hesitate to call with any questions or concerns.  We will keep you aware of any further encounters with Dipesh Pruitt.      Sincerely Yours,        CALLIE Gilliam

## 2018-06-17 ENCOUNTER — HOSPITAL ENCOUNTER (INPATIENT)
Dept: HOSPITAL 58 - ED | Age: 78
LOS: 3 days | Discharge: ANOTHER HOSPITAL | DRG: 193 | End: 2018-06-20
Attending: FAMILY MEDICINE | Admitting: FAMILY MEDICINE

## 2018-06-17 VITALS — BODY MASS INDEX: 30 KG/M2

## 2018-06-17 DIAGNOSIS — J18.1: Primary | ICD-10-CM

## 2018-06-17 DIAGNOSIS — J44.9: ICD-10-CM

## 2018-06-17 DIAGNOSIS — J96.00: ICD-10-CM

## 2018-06-17 DIAGNOSIS — R91.8: ICD-10-CM

## 2018-06-17 DIAGNOSIS — D49.1: ICD-10-CM

## 2018-06-17 DIAGNOSIS — Z72.0: ICD-10-CM

## 2018-06-17 PROCEDURE — 99284 EMERGENCY DEPT VISIT MOD MDM: CPT

## 2018-06-17 PROCEDURE — 96365 THER/PROPH/DIAG IV INF INIT: CPT

## 2018-06-17 PROCEDURE — 93010 ELECTROCARDIOGRAM REPORT: CPT

## 2018-06-17 PROCEDURE — 36415 COLL VENOUS BLD VENIPUNCTURE: CPT

## 2018-06-17 PROCEDURE — 85007 BL SMEAR W/DIFF WBC COUNT: CPT

## 2018-06-17 PROCEDURE — 80053 COMPREHEN METABOLIC PANEL: CPT

## 2018-06-17 PROCEDURE — 84484 ASSAY OF TROPONIN QUANT: CPT

## 2018-06-17 PROCEDURE — 82803 BLOOD GASES ANY COMBINATION: CPT

## 2018-06-17 PROCEDURE — 99221 1ST HOSP IP/OBS SF/LOW 40: CPT

## 2018-06-17 PROCEDURE — 85025 COMPLETE CBC W/AUTO DIFF WBC: CPT

## 2018-06-17 PROCEDURE — 82550 ASSAY OF CK (CPK): CPT

## 2018-06-17 PROCEDURE — 93005 ELECTROCARDIOGRAM TRACING: CPT

## 2018-06-17 PROCEDURE — 87040 BLOOD CULTURE FOR BACTERIA: CPT

## 2018-06-17 PROCEDURE — 94640 AIRWAY INHALATION TREATMENT: CPT

## 2018-06-17 RX ADMIN — SODIUM CHLORIDE SCH MLS/HR: 0.9 INJECTION, SOLUTION INTRAVENOUS at 17:27

## 2018-06-17 RX ADMIN — IPRATROPIUM BROMIDE AND ALBUTEROL SULFATE SCH VIAL: .5; 3 SOLUTION RESPIRATORY (INHALATION) at 18:26

## 2018-06-17 RX ADMIN — LOVASTATIN SCH MG: 20 TABLET ORAL at 22:03

## 2018-06-17 RX ADMIN — IPRATROPIUM BROMIDE AND ALBUTEROL SULFATE SCH VIAL: .5; 3 SOLUTION RESPIRATORY (INHALATION) at 22:33

## 2018-06-17 RX ADMIN — CEFTRIAXONE SODIUM SCH: 1 INJECTION, POWDER, FOR SOLUTION INTRAMUSCULAR; INTRAVENOUS at 16:27

## 2018-06-17 RX ADMIN — SODIUM CHLORIDE SCH MLS/HR: 9 INJECTION, SOLUTION INTRAVENOUS at 17:27

## 2018-06-17 RX ADMIN — SODIUM CHLORIDE SCH MLS/HR: 9 INJECTION, SOLUTION INTRAVENOUS at 22:03

## 2018-06-17 NOTE — DI
EXAM:  Two views of the chest. 

  

History:  Cough, short of breath 

  

Findings:  Heart size is upper limits of normal.  Calcified granulomas seen within the thorax.  Left 
lower lobe and lingular consolidation.  No appreciable pleural fluid and no pneumothorax. No acute os
seous abnormalities. 

  

Impression:  Left lower lung pneumonia.  Follow-up recommended.

## 2018-06-17 NOTE — ED.PDOC
General


ED Provider: 


Dr. WILLARD MÉNDEZ





Chief Complaint: Respiratory Complaint


Stated Complaint: shortness of breath


Time Seen by Physician: 13:39


Mode of Arrival: Walk-In


Information Source: Patient


Exam Limitations: No limitations


Primary Care Provider: 


JORGE WALKER





Nursing and Triage Documentation Reviewed and Agree: Yes


Reviewed sepsis parameters & appropriate labs ordered?: Yes


System Inflammatory Response Syndrome: Not Applicable


Sepsis Protocol: 


For patient's 13 years and over:





Temp is 96.8 and below  and greater


Pulse >90 BPM


Resp >20/minute


Acutely Altered Mental Status





Are patient's symptoms suggestive of a new infection, such as:


   -Pneumonia


   -Skin, Soft Tissue


   -Endocarditis


   -UTI


   -Bone, Joint Infection


   -Implantable Device


   -Acute Abdominal Infection


   -Wound Infection


   -Meningitis


   -Blood Stream Catheter Infection


   -Unknown








Respiratory Complaint Exam





- Respiratory Complaint/Exam


Onset/Duration: 2 weeks 


Symptoms Are: Still present


Timing: Intermittent


Initial Severity: Moderate


Current Severity: Mild


Location: Nose, Throat, Chest


Character: Reports: Non-productive cough, Dry cough


Aggravating: Reports: Weather, Recumbent position


Associated Signs and Symptoms: Reports: URI, Nasal congestion.  Denies: Rapid 

breathing, Dyspnea, Fever, Chills, Chest pain, Pleuritic chest pain, Wheezing, 

Hemoptysis, Dizziness, Calf pain, Calf swelling, Edema, Hoarseness, Sinus 

discomfort, Vomiting, Sore throat, Weight loss, Decreased oral intake, 

Increased thirst, Increased appetite, Increased urination


Related History: Reports: Similar episode


History of Healthcare-Acquired Pneumonia: No


Related Surgical History: Reports: None


Pulmonary Embolism Risk Factors: Bedrest


Cardiac Risk Factors: Reports: Elevated lipids (copd)


Pseudomonas Risk Factors: Reports: Chronic Lung Disease


Tuberculosis Risk Factors: Reports: Chronic Resp. Faliure


Status Asthmaticus Risk Factors: Reports: None


Home Oxygen Use: No


Recent Stress Test: No


Recent Echo/LV Function: No


Current Antibiotic Use: No


Current Asthma Medication Use: No


Respiratory Distress: None


Inadequate Respiratory Effort: No


Dysphagia Present: No


Stridor Present: No


JVD Present: No


Retractions: Not Present


Diminished Breath Sounds: No


Sinus Tenderness: None


Grunting Respirations: No


Kussmaul Respirations: No


Differential Diagnoses: Pneumonia, Bronchitis, Lower Resp. Infection





Review of Systems





- Review Of Systems


Constitutional: Reports: Chills, Malaise


Eyes: Reports: No symptoms


Ears, Nose, Mouth, Throat: Reports: No symptoms


Respiratory: Reports: Cough, Short of air


Cardiac: Reports: No symptoms


GI: Reports: No symptoms


: Reports: No symptoms


Musculoskeletal: Reports: No symptoms


Skin: Reports: No symptoms


Neurological: Reports: No symptoms


Endocrine: Reports: No symptoms


Hematologic/Lymphatic: Reports: No symptoms


All Other Systems: Reviewed and Negative





Past Medical History





- Past Medical History


Previously Healthy: Yes


Endocrine: Reports: Dyslipidemia


Cardiovascular: Reports: None


Respiratory: Reports: COPD


Hematological: Reports: None


Gastrointestinal: Reports: None


Genitourinary: Reports: None


Neuro/Psych: Reports: None


Musculoskeletal: Reports: None


Cancer: Reports: None





- Surgical History


General Surgical History: Reports: None





- Family History


Family History: Reports: None





- Social History


Smoking Status: Current every day smoker, Light tobacco smoker


Hx Substance Use: No


Alcohol Screening: None





Physical Exam





- Physical Exam


Appearance: Ill-appearing


Ill-appearing: Mild


Eyes: OLIVE, EOMI, Conjunctiva clear


ENT: Ears normal, Nose normal, Oropharynx normal


Respiratory: Rhonchi, Wheezes


Cardiovascular: RRR, Pulses normal, No rub, No murmur


GI/: Soft, Nontender, No masses, Bowel sounds normal, No Organomegaly


Musculoskeletal: Normal strength, ROM intact, No edema, No calf tenderness


Skin: Warm, Dry, Normal color


Neurological: Sensation intact, Motor intact, Reflexes intact, Cranial nerves 

intact, Alert, Oriented


Psychiatric: Affect appropriate, Mood appropriate





Interpretation





- Radiology Interpretation


Radiology Interpretation By: Radiologist


Exam Interpreted: Other (LLL PNEUMONIA)





- Cardiac Monitor


Rate: Normal


Rhythm: Sinus


Ectopy: None





- EKG Interpretation


Rate: Normal


Rhythm: Sinus


Ectopy: None


Axis: NL


ST Segment: Normal





Re-Evaluation





- Re-Evaluation


Time of Re-Evaluation: 15:00 (NO ACUTE RESP EVENT WHILE IN THE ED )


Status: Unchanged


Vital Signs Stable: Yes


Pain Level: 0


Appearance: NAD


Lungs: Clear (RIGHT LEFT RONCHI NOTED BASES)


Skin: Warm and Dry


Neuro: Alert and Oriented X3


CV: RRR





Critical Care Note





- Critical Care Note


Total Time (mins): 0





Course





- Course


Hematology/Chemistry: 


 06/17/18 13:50





 06/17/18 13:50


Orders, Labs, Meds: 





Lab Review











  06/17/18 06/17/18 06/17/18





  13:50 13:50 13:50


 


WBC  11.54 H  


 


RBC  5.17  


 


Hgb  16.1  


 


Hct  47.4  


 


MCV  91.7  


 


MCH  31.1 H  


 


MCHC  34.0  


 


RDW Coeff of Lissette  13.4  


 


Plt Count  263  


 


Neutrophils % (Manual)  57.0  


 


Lymphocytes % (Manual)  26.0  


 


Monocytes % (Manual)  10.0  


 


Eosinophils % (Manual)  7.0 H  


 


Plt Morphology Comment  Normal  


 


Anisocytosis  Not present  


 


RBC Morph Comment  Normal  


 


Puncture Site    L rad


 


O2 Saturation    87.0 L


 


ABG pH    7.432


 


ABG pCO2    40.0


 


ABG pO2    52.0 L*


 


ABG HCO3    26.7 H


 


ABG Total CO2    28


 


ABG Base Excess    2


 


Ajnn Test    +


 


FiO2 %    21.0


 


Sodium   141 


 


Potassium   4.3 


 


Chloride   105 


 


Carbon Dioxide   28 


 


Anion Gap   12.3 


 


BUN   19 H 


 


Creatinine   0.79 


 


Estimated GFR (MDRD)   96.00 


 


BUN/Creatinine Ratio   24.05 


 


Glucose   87 


 


Calcium   9.5 


 


Total Bilirubin   1.6 H 


 


AST   18 


 


ALT   12 


 


Alkaline Phosphatase   90 


 


Total Creatine Kinase   54 


 


Troponin I   < 0.0100 


 


Total Protein   7.6 


 


Albumin   3.3 L 


 


Globulin   4.3 


 


Albumin/Globulin Ratio   0.77 








Orders











 Category Date Time Status


 


 ABG DRAW REQUEST Stat CARDIO  06/17/18 13:40 Ordered


 


 EKG-(ED ONLY) Stat CARDIO  06/17/18 13:41 Ordered


 


 NEBULIZER TREATMENT Stat CARDIO  06/17/18 13:41 Ordered


 


 ABG Stat LAB  06/17/18 13:40 Ordered


 


 BLOOD CULTURE (ED ONLY) Stat LAB  06/17/18 Ordered


 


 CBC W/ AUTO DIFF Stat LAB  06/17/18 13:39 Ordered


 


 COMPREHENSIVE METABOLIC PANEL Stat LAB  06/17/18 13:39 Ordered


 


 CREATINE KINASE Stat LAB  06/17/18 13:39 Ordered


 


 MANUAL DIFFERENTIAL Stat LAB  06/17/18 13:50 Completed


 


 TROPONIN I Stat LAB  06/17/18 13:39 Ordered


 


 Ipratropium/Albuterol Neb [Duoneb] MEDS  06/17/18 13:40 Stat





 1 vial NEB ONCE STA   


 


 CHEST, 2 VIEWS PA & LAT Stat RADS  06/17/18 13:39 Ordered








Medications














Discontinued Medications














Generic Name Dose Route Start Last Admin





  Trade Name Freq  PRN Reason Stop Dose Admin


 


Albuterol/Ipratropium  1 vial  06/17/18 13:40  06/17/18 13:55





  Duoneb  NEB  06/17/18 13:41  1 vial





  ONCE STA   Administration





     





     





     





     











Vital Signs: 





 











  Temp Pulse Resp BP Pulse Ox


 


 06/17/18 13:36  98.2 F  93 H  28 H  155/65 H  85 L














Departure





- Departure


Time of Disposition: 15:05


Disposition: ADMITTED AS INPATIENT


Discharge Problem: 


Pneumonia


Qualifiers:


 Pneumonia type: due to unspecified organism Laterality: left Lung location: 

lower lobe of lung Qualified Code(s): J18.1 - Lobar pneumonia, unspecified 

organism





Instructions:  Pneumonitis (ED)


Condition: Good


Pt referred to PMD for follow-up: Yes


IPMP verified?: No


Additional Instructions: 


Please call your Family Physician as soon as possible to schedule a follow-up 

appointment.


Allergies/Adverse Reactions: 


Allergies





No Known Allergies Allergy (Verified 06/17/18 13:41)


 








Home Medications: 


Ambulatory Orders





Aspirin [Aspirin Chewable] 81 mg PO DAILYWM 06/17/18 


Fluticasone/Vilanterol [Breo Ellipta 200-25 Mcg INH] 1 each IH DAILY 06/17/18 


Lovastatin [Mevacor] 20 mg PO BEDTIME 06/17/18 








Disposition Discussed With: Patient, Family (REPORTS OF CHEST XRAY GIVEN LABS 

AND OTHER DATA SHARED WITH PT AND HIS WIFE PMD NOTIFIED 3:11 PM)

## 2018-06-18 RX ADMIN — SODIUM CHLORIDE SCH MLS/HR: 9 INJECTION, SOLUTION INTRAVENOUS at 09:34

## 2018-06-18 RX ADMIN — GUAIFENESIN SCH MG: 600 TABLET, EXTENDED RELEASE ORAL at 08:35

## 2018-06-18 RX ADMIN — IPRATROPIUM BROMIDE AND ALBUTEROL SULFATE SCH VIAL: .5; 3 SOLUTION RESPIRATORY (INHALATION) at 11:13

## 2018-06-18 RX ADMIN — IPRATROPIUM BROMIDE AND ALBUTEROL SULFATE SCH VIAL: .5; 3 SOLUTION RESPIRATORY (INHALATION) at 04:40

## 2018-06-18 RX ADMIN — CEFTRIAXONE SODIUM SCH MLS/HR: 1 INJECTION, POWDER, FOR SOLUTION INTRAMUSCULAR; INTRAVENOUS at 08:35

## 2018-06-18 RX ADMIN — GUAIFENESIN SCH: 600 TABLET, EXTENDED RELEASE ORAL at 08:41

## 2018-06-18 RX ADMIN — METHYLPREDNISOLONE SODIUM SUCCINATE SCH MG: 40 INJECTION, POWDER, FOR SOLUTION INTRAMUSCULAR; INTRAVENOUS at 13:34

## 2018-06-18 RX ADMIN — METHYLPREDNISOLONE SODIUM SUCCINATE SCH MG: 40 INJECTION, POWDER, FOR SOLUTION INTRAMUSCULAR; INTRAVENOUS at 06:27

## 2018-06-18 RX ADMIN — SODIUM CHLORIDE SCH MLS/HR: 9 INJECTION, SOLUTION INTRAVENOUS at 20:33

## 2018-06-18 RX ADMIN — GUAIFENESIN SCH MG: 600 TABLET, EXTENDED RELEASE ORAL at 20:31

## 2018-06-18 RX ADMIN — SODIUM CHLORIDE SCH MLS/HR: 0.9 INJECTION, SOLUTION INTRAVENOUS at 12:41

## 2018-06-18 RX ADMIN — IPRATROPIUM BROMIDE AND ALBUTEROL SULFATE SCH VIAL: .5; 3 SOLUTION RESPIRATORY (INHALATION) at 17:01

## 2018-06-18 RX ADMIN — IPRATROPIUM BROMIDE AND ALBUTEROL SULFATE SCH VIAL: .5; 3 SOLUTION RESPIRATORY (INHALATION) at 23:35

## 2018-06-18 RX ADMIN — ASPIRIN SCH MG: 81 TABLET, CHEWABLE ORAL at 08:35

## 2018-06-18 RX ADMIN — GUAIFENESIN SCH MG: 600 TABLET, EXTENDED RELEASE ORAL at 06:27

## 2018-06-18 RX ADMIN — LOVASTATIN SCH MG: 20 TABLET ORAL at 20:32

## 2018-06-18 RX ADMIN — METHYLPREDNISOLONE SODIUM SUCCINATE SCH MG: 40 INJECTION, POWDER, FOR SOLUTION INTRAMUSCULAR; INTRAVENOUS at 21:18

## 2018-06-18 NOTE — TELEPHONE ENCOUNTER
Kayleen says it's the same as the Umeclidinium Bromide INH. He uses at home at night at home. Wants to know it if needs to be continued there?

## 2018-06-18 NOTE — TELEPHONE ENCOUNTER
She says he uses the incruse elliptal at home. Wants to know if it needs to be continued in the hospital.

## 2018-06-19 RX ADMIN — METHYLPREDNISOLONE SODIUM SUCCINATE SCH MG: 40 INJECTION, POWDER, FOR SOLUTION INTRAMUSCULAR; INTRAVENOUS at 07:58

## 2018-06-19 RX ADMIN — IPRATROPIUM BROMIDE AND ALBUTEROL SULFATE SCH VIAL: .5; 3 SOLUTION RESPIRATORY (INHALATION) at 20:05

## 2018-06-19 RX ADMIN — IPRATROPIUM BROMIDE AND ALBUTEROL SULFATE SCH VIAL: .5; 3 SOLUTION RESPIRATORY (INHALATION) at 05:10

## 2018-06-19 RX ADMIN — SODIUM CHLORIDE SCH: 0.9 INJECTION, SOLUTION INTRAVENOUS at 17:55

## 2018-06-19 RX ADMIN — IPRATROPIUM BROMIDE AND ALBUTEROL SULFATE SCH VIAL: .5; 3 SOLUTION RESPIRATORY (INHALATION) at 10:04

## 2018-06-19 RX ADMIN — CEFTRIAXONE SODIUM SCH MLS/HR: 1 INJECTION, POWDER, FOR SOLUTION INTRAMUSCULAR; INTRAVENOUS at 08:44

## 2018-06-19 RX ADMIN — GUAIFENESIN SCH MG: 600 TABLET, EXTENDED RELEASE ORAL at 08:44

## 2018-06-19 RX ADMIN — GUAIFENESIN SCH MG: 600 TABLET, EXTENDED RELEASE ORAL at 20:19

## 2018-06-19 RX ADMIN — IPRATROPIUM BROMIDE AND ALBUTEROL SULFATE SCH VIAL: .5; 3 SOLUTION RESPIRATORY (INHALATION) at 14:03

## 2018-06-19 RX ADMIN — LOVASTATIN SCH MG: 20 TABLET ORAL at 20:19

## 2018-06-19 RX ADMIN — SODIUM CHLORIDE SCH MLS/HR: 9 INJECTION, SOLUTION INTRAVENOUS at 20:19

## 2018-06-19 RX ADMIN — SODIUM CHLORIDE SCH MLS/HR: 0.9 INJECTION, SOLUTION INTRAVENOUS at 03:38

## 2018-06-19 RX ADMIN — METHYLPREDNISOLONE SODIUM SUCCINATE SCH MG: 40 INJECTION, POWDER, FOR SOLUTION INTRAMUSCULAR; INTRAVENOUS at 13:09

## 2018-06-19 RX ADMIN — SODIUM CHLORIDE SCH MLS/HR: 9 INJECTION, SOLUTION INTRAVENOUS at 09:56

## 2018-06-19 RX ADMIN — METHYLPREDNISOLONE SODIUM SUCCINATE SCH MG: 40 INJECTION, POWDER, FOR SOLUTION INTRAMUSCULAR; INTRAVENOUS at 21:58

## 2018-06-19 RX ADMIN — ASPIRIN SCH MG: 81 TABLET, CHEWABLE ORAL at 08:44

## 2018-06-20 ENCOUNTER — HOSPITAL ENCOUNTER (OUTPATIENT)
Dept: HOSPITAL 58 - AMBL | Age: 78
Discharge: ANOTHER HOSPITAL | End: 2018-06-20
Attending: FAMILY MEDICINE
Payer: COMMERCIAL

## 2018-06-20 VITALS — SYSTOLIC BLOOD PRESSURE: 132 MMHG | DIASTOLIC BLOOD PRESSURE: 71 MMHG | TEMPERATURE: 98 F

## 2018-06-20 VITALS — BODY MASS INDEX: 30 KG/M2

## 2018-06-20 DIAGNOSIS — J18.9: Primary | ICD-10-CM

## 2018-06-20 DIAGNOSIS — Z99.81: ICD-10-CM

## 2018-06-20 PROBLEM — R91.8 HILAR MASS: Status: ACTIVE | Noted: 2018-01-01

## 2018-06-20 PROBLEM — J96.00 ACUTE RESPIRATORY FAILURE (HCC): Status: ACTIVE | Noted: 2018-01-01

## 2018-06-20 RX ADMIN — IPRATROPIUM BROMIDE AND ALBUTEROL SULFATE SCH VIAL: .5; 3 SOLUTION RESPIRATORY (INHALATION) at 05:40

## 2018-06-20 RX ADMIN — SODIUM CHLORIDE SCH MLS/HR: 9 INJECTION, SOLUTION INTRAVENOUS at 09:55

## 2018-06-20 RX ADMIN — IPRATROPIUM BROMIDE AND ALBUTEROL SULFATE SCH VIAL: .5; 3 SOLUTION RESPIRATORY (INHALATION) at 14:23

## 2018-06-20 RX ADMIN — ASPIRIN SCH MG: 81 TABLET, CHEWABLE ORAL at 08:55

## 2018-06-20 RX ADMIN — METHYLPREDNISOLONE SODIUM SUCCINATE SCH MG: 40 INJECTION, POWDER, FOR SOLUTION INTRAMUSCULAR; INTRAVENOUS at 08:56

## 2018-06-20 RX ADMIN — CEFTRIAXONE SODIUM SCH MLS/HR: 1 INJECTION, POWDER, FOR SOLUTION INTRAMUSCULAR; INTRAVENOUS at 08:55

## 2018-06-20 RX ADMIN — IPRATROPIUM BROMIDE AND ALBUTEROL SULFATE SCH VIAL: .5; 3 SOLUTION RESPIRATORY (INHALATION) at 10:10

## 2018-06-20 RX ADMIN — GUAIFENESIN SCH MG: 600 TABLET, EXTENDED RELEASE ORAL at 08:55

## 2018-06-20 NOTE — CT
EXAM:  CT chest without contrast. 

  

HISTORY:  Cough, shortness of breath. 

  

COMPARISON:  Radiograph earlier the same day. 

  

TECHNIQUE:  Multiple axial images of the chest were obtained without intravenous contrast.  Images we
re reformatted in the sagittal and coronal planes. 

  

FINDINGS:  Evaluation for lymphadenopathy is limited by lack of intravenous contrast.  Subcarinal lym
ph node measures up to 2.9 cm short axis on axial image 31.  Left hilar soft tissue mass extends into
 the anterior left upper lobe and AP window with overall mass measuring approximately 12.3 x 6.2 cm o
n axial image 29.  This causes some slight mass effect on the trachea at the level of the archana, alt
robe no endobronchial extension identified. Consolidation extends into the lingula  A discrete 0.6 c
m right lower lobe nodule noted on axial image 42.  Mild dependent subsegmental atelectasis seen in t
he posterior right lower lobe near the diaphragm.  Severe emphysematous changes present. No pleural e
ffusion or pneumothorax identified. 

  

Heart size is normal.  Atherosclerotic calcifications present.  No pericardial effusion identified. 

  

Limited images of the upper abdomen demonstrate no acute finding.  No osteolytic or osteoblastic lesi
ons seen.  Degenerative changes present in the spine 

  

--------------------------- 

IMPRESSION: 

1.  Large left hilar soft tissue mass extending into the anteromedial left upper lobe and lingula and
 invading the mediastinum at the level of the AP window with some mass effect on the trachea near the
 archana.  No joan endobronchial extension. Findings represent neoplasm until proven otherwise. 

2.  Large subcarinal lymphadenopathy. 

3.  Right lower lobe micronodule which requires followup. 

4.  Severe emphysema.

## 2018-06-20 NOTE — TELEPHONE ENCOUNTER
Bridget may called and stating that pt thinks that he is going home today bridget said that she is certain hewill need home o2 and neb machine but I told her dr simpson was supposed to see this pt today so she is going to check on that for us and let us know

## 2018-06-20 NOTE — DI
EXAM:  Two views of the chest. 

  

History:  Follow-up pneumonia 

  

Comparison:  Chest radiograph 06/17/2018 

  

Findings:  Heart size is stable.  Calcified granulomas again seen within the thorax.  The left lower 
lung consolidation is stable to minimally improved.  No developing opacities.  Left hilar prominence 
again noted.  No acute osseous abnormalities.  No pneumothorax. 

  

Impression:  Left lower lung pneumonia is stable to minimally improved.  Continued follow-up is recom
mended.

## 2018-06-20 NOTE — TELEPHONE ENCOUNTER
Per dr jo cxr was not any better so ct scan chest w/o contrst today f/u pneumo spoke to mendoza and she said dr simpson just seen him he stated he did not see anything wrong with throat and he suggest librax 1mg tid

## 2018-06-20 NOTE — TELEPHONE ENCOUNTER
I got a call from ashtyn at [facility] this pt states that his throat hurts and has a issue swollowing no certain foods or drinks cause this no issues breathing or anything like that ashtyn just wants u to be aware

## 2018-06-21 NOTE — CONS
DATE OF SERVICE:  06/20/18





REASON FOR CONSULTATION/HISTORY: 

This is a pleasant 77-year-old gentleman with a history of being hospitalized 
with pneumonia. He has a history of smoking all of his life and is now down to 
approximately 1/2 pack a day. He feels as though food catches in his throat, 
relaxes and goes down. He say clinically it may be getting a little better 
since he had symptoms approximately 3 to 4 days ago. 



PHYSICAL EXAMINATION:

HEENT: Ears are clear.  Nose:: Normal mucosa. Mouth:  Oropharynx reveals no 
lesions. Hypopharnyx reveals good movement of vocal cords. 

NECK: Supple. 



IMPRESSION:  

I feel this probably represents cricopharyngeus muscle spasm.  



RECOMMENDATIONS:

Since this is getting better clinically, I suggest possibly conservative 
management. Also suggested considering Librax one t.i.d. for dysphagia. Return 
to the [facility] in two to three weeks and I will use a flexible scope to 
look at his vocal cords  I feel this does not represent a major problem at this 
time and hopefully when he gets over his pneumonia, his symptoms will resolve. 
MADDY

## 2018-06-22 NOTE — ANESTHESIA PROCEDURE NOTES
Airway  Urgency: elective    Date/Time: 6/22/2018 1:52 PM  End Time:6/22/2018 1:52 PM  Airway not difficult    General Information and Staff    Patient location during procedure: pre-op  CRNA: NORMA MEIER    Indications and Patient Condition  Indications for airway management: airway protection    Preoxygenated: yes  Mask difficulty assessment: 1 - vent by mask    Final Airway Details  Final airway type: endotracheal airway      Successful airway: ETT  Cuffed: yes   Successful intubation technique: direct laryngoscopy  Endotracheal tube insertion site: oral  Blade: Hunter  Blade size: #2  ETT size: 7.5 mm  Cormack-Lehane Classification: grade IIa - partial view of glottis  Placement verified by: capnometry   Cuff volume (mL): 6  Measured from: lips  ETT to lips (cm): 22  Number of attempts at approach: 1    Additional Comments  Large amaount of thick copiuos sputum suctioned out

## 2018-06-22 NOTE — ANESTHESIA PREPROCEDURE EVALUATION
Anesthesia Evaluation     Patient summary reviewed   no history of anesthetic complications:  NPO Solid Status: > 8 hours  NPO Liquid Status: > 8 hours           Airway   Mallampati: III  Neck ROM: full  no difficulty expected  Dental    (+) edentulous    Pulmonary    (+) a smoker (0.5 ppd) Current, COPD mild, decreased breath sounds, wheezes,   (-) asthma, recent URI, sleep apnea    ROS comment: Denies O2 use at home  Chronic cough with clear sputum  Cardiovascular - normal exam  Exercise tolerance: poor (<4 METS) (SOB with exertion, but denies chest pain)    ECG reviewed  Rhythm: regular  Rate: normal    (+) PVD (AAA repair 2016, endovascular), hyperlipidemia,   (-) pacemaker, hypertension, past MI, angina, cardiac stents      Neuro/Psych  (-) seizures, TIA, CVA  GI/Hepatic/Renal/Endo    (+) obesity,     (-) GERD, liver disease, no renal disease, diabetes, hypothyroidism, hyperthyroidism    Musculoskeletal     Abdominal    Substance History      OB/GYN          Other                        Anesthesia Plan    ASA 3     general     intravenous induction   Anesthetic plan and risks discussed with patient.

## 2018-06-23 NOTE — ANESTHESIA POSTPROCEDURE EVALUATION
Patient: Dipesh Pruitt    Procedure Summary     Date:  06/22/18 Room / Location:   [facility]  /  [facility]    Anesthesia Start:  1347 Anesthesia Stop:  1433    Procedure:  BRONCHOSCOPY WITH ENDOBRONCHIAL ULTRASOUND (Left Bronchus) Diagnosis:      Surgeon:  Morgan Obando MD Provider:  Dayron Baldwin CRNA    Anesthesia Type:  general ASA Status:  3          Anesthesia Type: general  Last vitals  BP   120/65 (06/22/18 1925)   Temp   98.4 °F (36.9 °C) (06/22/18 1925)   Pulse   92 (06/22/18 1925)   Resp   20 (06/22/18 1925)     SpO2   91 % (06/22/18 1925)     Post Anesthesia Care and Evaluation    Patient location during evaluation: PACU  Patient participation: complete - patient participated  Level of consciousness: awake  Pain score: 0  Pain management: adequate  Airway patency: patent  Anesthetic complications: No anesthetic complications  PONV Status: none  Cardiovascular status: acceptable  Respiratory status: acceptable  Hydration status: acceptable

## 2018-07-01 NOTE — PROGRESS NOTES
Dipesh had rough nite with sob and hypoxia last night--moved to hi-flow oxygen and both the patient and spouse asleep in the room    Review of Systems   Constitutional: Negative.    HENT: Negative.    Eyes: Negative.    Respiratory: Positive for cough and shortness of breath.    Cardiovascular: Negative.    Gastrointestinal: Negative.    Endocrine: Negative.    Genitourinary: Negative.    Musculoskeletal: Negative.    Skin: Negative.    Allergic/Immunologic: Negative.    Neurological: Negative.    Hematological: Negative.    Psychiatric/Behavioral: Negative.      Temp:  [96.8 °F (36 °C)-98.1 °F (36.7 °C)] 96.8 °F (36 °C)  Heart Rate:  [47-98] 85  Resp:  [16-20] 18  BP: ()/(49-87) 130/87  No intake/output data recorded.  No intake/output data recorded.    Physical Exam   Constitutional: He is oriented to person, place, and time. He appears well-developed and well-nourished.   HENT:   Head: Normocephalic.   Eyes: EOM are normal. Pupils are equal, round, and reactive to light.   Neck: Normal range of motion. Neck supple.   Cardiovascular: Normal rate, regular rhythm, normal heart sounds and intact distal pulses.    Pulmonary/Chest: Effort normal.   Abdominal: Soft. Bowel sounds are normal.   Musculoskeletal: Normal range of motion.   Neurological: He is alert and oriented to person, place, and time.   Skin: Skin is warm. Capillary refill takes less than 2 seconds.   Psychiatric: He has a normal mood and affect. His behavior is normal. Judgment and thought content normal.   Nursing note and vitals reviewed.      Active Problems:    Acute respiratory failure    Lung neoplasm    Continue oxygen support for now

## 2018-07-01 NOTE — PROGRESS NOTES
Dipesh Pruitt  77 y.o.      Subjective  No events    Temp:  [96.8 °F (36 °C)-98.1 °F (36.7 °C)] 96.8 °F (36 °C)  Heart Rate:  [47-98] 85  Resp:  [16-20] 18  BP: ()/(49-87) 130/87      Objective    Neurologic Exam    NAD  Awake  Follows commands    Active Problems:    Acute respiratory failure    Lung neoplasm      Lab Results (last 24 hours)     ** No results found for the last 24 hours. **        Ct Head With & Without Contrast    Result Date: 6/23/2018  Narrative: EXAMINATION: CT HEAD W WO CONTRAST-   6/23/2018 2:28 PM CDT  HISTORY: Abn findings in other body fluids and substances. Metastatic cancer evaluation.  In order to have a CT radiation dose as low as reasonably achievable Automated Exposure Control was utilized for adjustment of the mA and/or KV according to patient size.  DLP in mGycm= 1358.  Noncontrast head CT with no comparison.  No skull abnormality. The visualized paranasal sinuses are clear.  Noncontrast imaging shows no intracranial hemorrhage and no hydrocephalus.  There is a heterogeneously enhancing mass in the midline. This finding appears to be intra-axial and within the upper brainstem. Mass = 2.3 x 2.1 x 2.2 cm. No other enhancing brain lesion is seen.  Summary: 1. 2.3 cm enhancing mass in the upper brainstem. 2. No hydrocephalus or hemorrhage.            This report was finalized on 06/23/2018 15:14 by Dr. Wu Arizmendi MD.    Mri Angiogram Head Without Contrast    Result Date: 6/28/2018  Narrative:  History: 77-year-old with vertigo. Headache. Cerebrovascular malformation. No pontine brain mass.  Technique: Nonenhanced brain MRA with rotating 3-D MIP reconstructions.  Reference: MRI brain June 24, 2018.  Findings: Motion degraded exam. No gross stenosis of the V4 segments vertebral arteries bilaterally. Basilar artery demonstrates normal flow signal characteristics. Proximal posterior cerebral arteries are unremarkable. Patent bilateral posterior communicating arteries are  seen.  Atherosclerotic irregularity of the intracranial internal carotid arteries with no gross high-grade stenosis.  No gross focal stenosis of either proximal middle cerebral artery or anterior cerebral artery. The right A2 KATERYNA is larger in caliber than the left.  No brain aneurysm or vascular malformation is observed.  Redemonstrated pontine mass.       Impression: 1. Motion degraded exam. No anterior or posterior circulation occlusion or high-grade stenosis. 2. No aneurysm or vascular malformation identified. 3. Redemonstrated pontine mass. This report was finalized on 06/28/2018 12:54 by Dr Valentín Pearl, .    Mri Angiogram Head With & Without Contrast    Result Date: 6/30/2018  Narrative: MRI ANGIOGRAM HEAD W WO CONTRAST- 6/30/2018 9:48 AM CDT  HISTORY: pontine lesion. angioma vs cavernoma?   PER Dr. MCCONNELL, MUST DO WITH CONTRAST- CONTACT HIM FOR INSTRUCTIONS IF QUESTIONS; R13.12-Dysphagia, oropharyngeal phase; R91.8-Other nonspecific abnormal finding of lung field; R91.8-Other nonspecific abnormal finding of lung field; I99.9-Unspecified disorder of circulatory system; J43.8-Other emphysema; J96.01-Acute respiratory failure with hypoxia  TECHNIQUE: Contrast-enhanced brain MRA through the [place]. 3-D and maximum intensity projection (MIP) images were created from the axial source data.  COMPARISON: Time-of-flight MRA dated 6/28/2018, brain MRI dated 6/24/2018  FINDINGS:  Redemonstrated 2.6 cm round expansile lesion centered in the central jennifer (series [id number] [id number]). On the angiogram there appear to be several small caliber linear branching vessels within the lesion. Adjacent to this there appears to be a fairly homogeneous enhancement throughout the lesion, slightly less prominent than the degree of the vessel enhancement. No other lesions are identified. The large central arteries of the anterior and posterior circulation are normal in caliber with no flow-limiting stenosis or aneurysm. FLAIR and T2  Star weighted pulse sequences were also obtained with the FLAIR images showing hyperintensity within the jennifer and cerebellar peduncles. The T2*weighted images show heterogeneous stippled susceptibility centrally within the lesion with a fairly continuous rim of blooming the edge of the lesion.      Impression: 1. The imaging characteristics are certainly not pathopneumonic for a single etiology; however, the appearance is atypical for a metastatic lesion. No other lesions are identified. Given the appearance and location, a vascular lesion such as cavernoma is favored. Imaging surveillance is recommended to ensure stability. This report was finalized on 06/30/2018 20:37 by Dr Julio Sosa, .    Mri Brain With & Without Contrast    Result Date: 6/24/2018  Narrative: EXAMINATION: MRI BRAIN W WO CONTRAST-  6/24/2018 12:28 PM CDT  HISTORY: assess brainstem mass; R91.8-Other nonspecific abnormal finding of lung field; R91.8-Other nonspecific abnormal finding of lung field.  Pre and postcontrast MR imaging of the brain.  No acute infarct. Mild to moderate diffuse cortical atrophy. Normal ventricle size.  Central pontine mass measuring 2.3 cm AP by 2.4 cm wide by 2.2 cm tall. Surrounding brain stem and cerebral peduncle FLAIR hyperintensity suggests gliosis as there does not appear to be significant edema on the T2 sequence.  The lesion has discrete margins and does not have an aggressive appearance. Thin low signal margin on the gradient echo sequence compatible with hemosiderin or calcification. Similar scattered small foci of low signal within the lesion.  Postcontrast images show mild and heterogeneous enhancement.  Given the relatively nonaggressive appearance of this finding and the report of the patient's relatively neurological mild symptoms I think it is likely that this represents a low-grade and long-standing process rather than a metastasis from the patient's known lung mass.  Based on the MR imaging this is  an intra-axial lesion.  No other enhancing brain lesion is seen. The dural venous sinuses are patent. The [place] arterial structures are appropriate.  Summary: 1. 2.4 x 2.3 cm intra-axial pontine mass with relatively mild mass effect. Low-grade neoplastic process favored over lung cancer metastasis. This report was finalized on 06/24/2018 13:21 by Dr. Wu Arizmendi MD.    Xr Chest 1 View    Result Date: 6/24/2018  Narrative: EXAMINATION: XR CHEST 1 VW-  6/24/2018 4:15 AM CDT  HISTORY: respiratory failure; R91.8-Other nonspecific abnormal finding of lung field; R91.8-Other nonspecific abnormal finding of lung field.  One view chest x-ray compared with 10/13/2016.  Lower lung volumes with increasing bibasilar infiltrate.  The upper lobes remain clear.  Underlying chronic change. Granulomatous lymph node calcification is seen in the right paratracheal region.  Heart size is normal.  Summary: 1. Lower lung volumes with increasing bibasilar infiltrate. This report was finalized on 06/24/2018 10:02 by Dr. Wu Arizmendi MD.    Us Aorta Limited    Result Date: 6/7/2018  Narrative: EXAMINATION:   US AORTA LIMITED-  6/7/2018 10:11 AM CDT  HISTORY: Aortic ultrasound  Proximal aorta is 3.2 x 3.2 cm.  Mid aorta is 2.3 x 2.3 cm.  The infrarenal abdominal aorta is 6.3 x 6.2 cm. This is unchanged from December 4, 2017. Endovascular stent grafts are present      Impression: Infrarenal abdominal aorta is 6.28 x 6.2 cm. This is not significantly changed from December 4, 2017. Endovascular stent graft are present. This report was finalized on 06/07/2018 10:14 by Dr. Toribio Martin MD.    Nm Pet Skull Base To Mid Thigh    Result Date: 6/29/2018  Narrative: EXAMINATION: PET/CT skull base to mid thigh 6/29/2018  HISTORY: Non-small cell lung cancer.  DOSE: 890 mGycm. Automated exposure control was utilized to diminish patient radiation dose..  FINDINGS: The patient's weight at the time of the procedure was 200 pounds with a blood  sugar of 10 5 mg/dL. Approximately 1 hour after injection of 8.1 mCi of F-18 FDG dedicated PET imaging was obtained from the skull base to the proximal thighs with a nonenhanced CT obtained simultaneously for attenuation correction.  Within the posterior margin of the right parotid gland and superficial to the sternocleidomastoid muscle there is a lymph node demonstrating mild increased metabolic activity with an SUV of approximately 2.1. This particular node could be reactive in nature. However, there is an 8 mm node posterior to the sternocleidomastoid on the left below the level of the mandibular angle which demonstrates intense abnormal metabolic activity measuring SUVs of 4.1. This should be considered a metastatic focus until otherwise proven.  There is a medial left upper lobe mass which is contiguous with the mediastinum measuring approximately 4.3 x 4.1 cm in size. This demonstrates a mean SUV of 9.6. Along its upper margin it is is contiguous with a oblong soft tissue mass which also is contiguous with and demonstrates invasion into the middle and anterior mediastinum. The overall dimensions of this mass is approximately 10.3 x 4.5 cm in size demonstrating a SUV of 9.5. There is also a component of the neoplasm which is involving the anterior left upper lobe extending into the anterior mediastinum measuring approximately 3.4 x 7.0 cm in size demonstrating an SUV of 8.2. There is a 1.7 and a 1.8 cm left hilar node measuring SUVs of 8.9 and 6.8 respectively. There is an enlarged subcarinal node measuring 4.0 cm in size measuring SUVs of 9.2. There is also a conglomerate AP window shayan mass measuring 3.8 x 5.1 cm in size measuring SUVs of 8.1.  Within the left upper quadrant of the abdomen there is a 1.8 cm shayan mass. This is adjacent to the tail of the pancreas insinuating itself between the lower pole of the spleen and right kidney. This was not present on a previous CT angiogram of the abdomen of  11/29/2016 and measures SUVs of 6.4. This is also a metastatic shayan mass.  There is normal physiologic uptake of the radiopharmaceutical within the urinary tract as well as some nonspecific GI tract activity. There is heterogeneous uptake of the radiopharmaceutical within the spine and bony pelvis. This may be reactive in nature without a definite discrete CT correlate. This is particularly noted within the mid and lower thoracic spine. If there are clinical findings suggesting bony metastatic disease MRI could be obtained to better delineate the nature of this process.      Impression: 1.. Left upper lobe mass which is contiguous with the mediastinum. This extends along the anterior mediastinum into the anterior thorax forming a large conglomerate mass. There is bulky mediastinal and hilar adenopathy. Distant metastases are noted to the left neck and left upper quadrant as described above. 2. Heterogeneous uptake of the radiopharmaceutical within the spine and pelvis. This may be reactive in nature related to ongoing treatment without a definite CT correlate identified. However, if there is a high clinical index of suspicion for bony metastatic disease follow-up with an MRI of the thoracic spine or bony pelvis would be suggested as this is the area with the most heterogeneous uptake of the radiopharmaceutical. This report was finalized on 06/29/2018 16:36 by Dr. Jhoan Wells MD.    Xr Abdomen Kub    Result Date: 6/29/2018  Narrative: HISTORY: Questionable constipation, abdominal pain  KUB: Frontal view the abdomen is obtained.  There is moderate gas and stool of the colon.. A redundant sigmoid colon is considered. Endovascular grafts are seen throughout the abdominal aorta with limbs extending into the iliac arteries. There are diffuse vascular calcifications. There are degenerative changes of the lumbar spine.      Impression: 1. Moderate gas and stool of the colon indicating constipation. Redundant sigmoid  colon suspected. Endovascular grafts with diffuse vascular calcification. This report was finalized on 06/29/2018 21:24 by Dr. Nai Givens MD.          Plan:     Brain stem mass - reviewed imaging findings with patient and wife.  Discussed no way for definitive diagnosis without tissue, but would be unusual to be metastatic disease.  Discussed options including referral to [facility] for possible biopsy and further evaluation vs monitor with serial imaging.  They voice understanding and wish to proceed with serial imaging.  Plan for 2 month follow up MRI.  Will sign off, call with questions/concerns.    Lupillo Mccarthy MD

## 2018-07-01 NOTE — PLAN OF CARE
Problem: Patient Care Overview  Goal: Plan of Care Review  Outcome: Ongoing (interventions implemented as appropriate)   07/01/18 8805   Coping/Psychosocial   Plan of Care Reviewed With patient;spouse   Plan of Care Review   Progress declining   OTHER   Outcome Summary Placed on heated high flow vapotherm to keep sats greater than 90%, BM this shift

## 2018-07-01 NOTE — PLAN OF CARE
Problem: Patient Care Overview  Goal: Plan of Care Review  Outcome: Ongoing (interventions implemented as appropriate)   07/01/18 9238   Coping/Psychosocial   Plan of Care Reviewed With patient;spouse   OTHER   Outcome Summary Patient stayed on heated high flow vapotherm with 40%LPM, O2 at 60%-patient has been running in the mid 90'2 with HR in mid 80's. Dr. Chen notified of no progress and chest congestion, requested RT to shedule breathing TX Q4hr as well as chest physiotherapy Q4hr..       Problem: Fall Risk (Adult)  Goal: Identify Related Risk Factors and Signs and Symptoms  Outcome: Ongoing (interventions implemented as appropriate)    Goal: Absence of Fall  Outcome: Ongoing (interventions implemented as appropriate)      Problem: Skin Injury Risk (Adult)  Goal: Identify Related Risk Factors and Signs and Symptoms  Outcome: Ongoing (interventions implemented as appropriate)    Goal: Skin Health and Integrity  Outcome: Ongoing (interventions implemented as appropriate)

## 2018-07-01 NOTE — PROGRESS NOTES
Dipesh is resting comfortably this am--for mri angio today to look at brain lesion    Review of Systems   Constitutional: Positive for fatigue.   HENT: Negative.    Eyes: Negative.    Respiratory: Positive for cough and shortness of breath.    Cardiovascular: Negative.    Gastrointestinal: Negative.    Endocrine: Negative.    Genitourinary: Negative.    Musculoskeletal: Negative.    Skin: Negative.    Allergic/Immunologic: Negative.    Neurological: Negative.    Hematological: Negative.    Psychiatric/Behavioral: Negative.      Temp:  [96.8 °F (36 °C)-98.1 °F (36.7 °C)] 96.8 °F (36 °C)  Heart Rate:  [47-98] 85  Resp:  [16-20] 18  BP: ()/(49-87) 130/87  No intake/output data recorded.  No intake/output data recorded.    Physical Exam   Constitutional: He is oriented to person, place, and time. He appears well-developed and well-nourished.   HENT:   Head: Normocephalic and atraumatic.   Right Ear: External ear normal.   Left Ear: External ear normal.   Nose: Nose normal.   Mouth/Throat: Oropharynx is clear and moist.   Eyes: Conjunctivae and EOM are normal. Pupils are equal, round, and reactive to light.   Neck: Normal range of motion. Neck supple.   Cardiovascular: Normal rate, regular rhythm, normal heart sounds and intact distal pulses.    Pulmonary/Chest: Effort normal and breath sounds normal.   Abdominal: Soft. Bowel sounds are normal.   Musculoskeletal: Normal range of motion.   Neurological: He is alert and oriented to person, place, and time.   Skin: Skin is warm and dry. Capillary refill takes less than 2 seconds.   Psychiatric: He has a normal mood and affect. His behavior is normal. Judgment and thought content normal.   Nursing note and vitals reviewed.      Active Problems:    Acute respiratory failure    Lung neoplasm    For MRI angiogram with contrast today to look at brain lesion

## 2018-07-01 NOTE — PROGRESS NOTES
[facility] Progress Note    Progress Note    Patient:  Dipesh Pruitt  YOB: 1940  Date of Service: 7/1/2018  MRN: [id number]   Acct: [id number]   Primary Care Physician: Jake Keller MD  Advance Directive:   Code Status and Medical Interventions:   Ordered at: 06/20/18 2014     Level Of Support Discussed With:    Patient    Next of Kin (If No Surrogate)     Code Status:    No CPR     Medical Interventions (Level of Support Prior to Arrest):    Full     Admit Date: 6/20/2018       Hospital Day: 11      Subjective:     Chief Compliant: Shortness of air.  On vapotherm at 40%. Seen with spouse and nurse.       Review of Systems:   Review of Systems   Constitutional: Negative for chills and fever.   HENT: Negative for mouth sores and sore throat.    Eyes: Negative for pain and visual disturbance.   Respiratory: Positive for shortness of breath.    Cardiovascular: Negative for chest pain and leg swelling.   Gastrointestinal: Negative for abdominal pain, nausea and vomiting.   Genitourinary: Negative for flank pain and hematuria.   Skin: Positive for pallor.   Neurological: Negative for seizures and speech difficulty.   Hematological: Does not bruise/bleed easily.   Psychiatric/Behavioral: Negative for agitation, confusion and hallucinations.           Medications:   Scheduled Meds:  atorvastatin 10 mg Oral Daily   bisacodyl 10 mg Rectal Daily   CloNIDine 1 patch Transdermal Weekly   docusate sodium 100 mg Oral BID   enoxaparin 40 mg Subcutaneous Q24H   fluconazole 100 mg Oral Q24H   hydrocortisone  Topical Q12H   ipratropium-albuterol 3 mL Nebulization 4x Daily - RT   nystatin  Topical Q8H       Continuous Infusions:     Labs:     Lab Results (last 72 hours)     Procedure Component Value Units Date/Time    AFB Culture - Bronchial Wash, Bronchus [[id number]]  (Normal) Collected:  06/22/18 1250    Specimen:  Bronchial Wash from Bronchus Updated:  06/29/18 1500     AFB Culture No  AFB isolated at 1 week     AFB Stain No acid fast bacilli seen on direct smear      No acid fast bacilli seen on concentrated smear            Radiology:     Imaging Results (last 72 hours)     Procedure Component Value Units Date/Time    MRI Angiogram Head With & Without Contrast [[id number]] Collected:  06/30/18 2023     Updated:  06/30/18 2040    Narrative:       MRI ANGIOGRAM HEAD W WO CONTRAST- 6/30/2018 9:48 AM CDT     HISTORY: pontine lesion. angioma vs cavernoma?   PER Dr. MCCONNELL, MUST  DO WITH CONTRAST- CONTACT HIM FOR INSTRUCTIONS IF QUESTIONS;  R13.12-Dysphagia, oropharyngeal phase; R91.8-Other nonspecific abnormal  finding of lung field; R91.8-Other nonspecific abnormal finding of lung  field; I99.9-Unspecified disorder of circulatory system; J43.8-Other  emphysema; J96.01-Acute respiratory failure with hypoxia     TECHNIQUE: Contrast-enhanced brain MRA through the [facility]. 3-D  and maximum intensity projection (MIP) images were created from the  axial source data.      COMPARISON: Time-of-flight MRA dated 6/28/2018, brain MRI dated  6/24/2018      FINDINGS:      Redemonstrated 2.6 cm round expansile lesion centered in the central  jennifer (series 201-image 86). On the angiogram there appear to be several  small caliber linear branching vessels within the lesion. Adjacent to  this there appears to be a fairly homogeneous enhancement throughout the  lesion, slightly less prominent than the degree of the vessel  enhancement. No other lesions are identified. The large central arteries  of the anterior and posterior circulation are normal in caliber with no  flow-limiting stenosis or aneurysm. FLAIR and T2 Star weighted pulse  sequences were also obtained with the FLAIR images showing  hyperintensity within the jennifer and cerebellar peduncles. The T2*weighted  images show heterogeneous stippled susceptibility centrally within the  lesion with a fairly continuous rim of blooming the edge of the lesion.        Impression:       1. The imaging characteristics are certainly not pathopneumonic for a  single etiology; however, the appearance is atypical for a metastatic  lesion. No other lesions are identified. Given the appearance and  location, a vascular lesion such as cavernoma is favored. Imaging  surveillance is recommended to ensure stability.  This report was finalized on 06/30/2018 20:37 by Dr Julio Sosa, .    XR Abdomen KUB [[id number]] Collected:  06/29/18 2122     Updated:  06/29/18 2127    Narrative:       HISTORY: Questionable constipation, abdominal pain     KUB: Frontal view the abdomen is obtained.     There is moderate gas and stool of the colon.. A redundant sigmoid colon  is considered. Endovascular grafts are seen throughout the abdominal  aorta with limbs extending into the iliac arteries. There are diffuse  vascular calcifications. There are degenerative changes of the lumbar  spine.       Impression:       1. Moderate gas and stool of the colon indicating constipation.  Redundant sigmoid colon suspected. Endovascular grafts with diffuse  vascular calcification.  This report was finalized on 06/29/2018 21:24 by Dr. Nai Givens MD.    NM Pet Skull Base To Mid Thigh [[id number]] Collected:  06/29/18 1608     Updated:  06/29/18 1639    Narrative:       EXAMINATION: PET/CT skull base to mid thigh 6/29/2018     HISTORY: Non-small cell lung cancer.     DOSE: 890 mGycm. Automated exposure control was utilized to diminish  patient radiation dose..     FINDINGS: The patient's weight at the time of the procedure was 200  pounds with a blood sugar of 10 5 mg/dL. Approximately 1 hour after  injection of 8.1 mCi of F-18 FDG dedicated PET imaging was obtained from  the skull base to the proximal thighs with a nonenhanced CT obtained  simultaneously for attenuation correction.     Within the posterior margin of the right parotid gland and superficial  to the sternocleidomastoid muscle there is a lymph node  demonstrating  mild increased metabolic activity with an SUV of approximately 2.1. This  particular node could be reactive in nature. However, there is an 8 mm  node posterior to the sternocleidomastoid on the left below the level of  the mandibular angle which demonstrates intense abnormal metabolic  activity measuring SUVs of 4.1. This should be considered a metastatic  focus until otherwise proven.     There is a medial left upper lobe mass which is contiguous with the  mediastinum measuring approximately 4.3 x 4.1 cm in size. This  demonstrates a mean SUV of 9.6. Along its upper margin it is is  contiguous with a oblong soft tissue mass which also is contiguous with  and demonstrates invasion into the middle and anterior mediastinum. The  overall dimensions of this mass is approximately 10.3 x 4.5 cm in size  demonstrating a SUV of 9.5. There is also a component of the neoplasm  which is involving the anterior left upper lobe extending into the  anterior mediastinum measuring approximately 3.4 x 7.0 cm in size  demonstrating an SUV of 8.2. There is a 1.7 and a 1.8 cm left hilar node  measuring SUVs of 8.9 and 6.8 respectively. There is an enlarged  subcarinal node measuring 4.0 cm in size measuring SUVs of 9.2. There is  also a conglomerate AP window shayan mass measuring 3.8 x 5.1 cm in size  measuring SUVs of 8.1.     Within the left upper quadrant of the abdomen there is a 1.8 cm shayan  mass. This is adjacent to the tail of the pancreas insinuating itself  between the lower pole of the spleen and right kidney. This was not  present on a previous CT angiogram of the abdomen of 11/29/2016 and  measures SUVs of 6.4. This is also a metastatic shayan mass.     There is normal physiologic uptake of the radiopharmaceutical within the  urinary tract as well as some nonspecific GI tract activity. There is  heterogeneous uptake of the radiopharmaceutical within the spine and  bony pelvis. This may be reactive in nature  without a definite discrete  CT correlate. This is particularly noted within the mid and lower  thoracic spine. If there are clinical findings suggesting bony  metastatic disease MRI could be obtained to better delineate the nature  of this process.       Impression:       1.. Left upper lobe mass which is contiguous with the mediastinum. This  extends along the anterior mediastinum into the anterior thorax forming  a large conglomerate mass. There is bulky mediastinal and hilar  adenopathy. Distant metastases are noted to the left neck and left upper  quadrant as described above.  2. Heterogeneous uptake of the radiopharmaceutical within the spine and  pelvis. This may be reactive in nature related to ongoing treatment  without a definite CT correlate identified. However, if there is a high  clinical index of suspicion for bony metastatic disease follow-up with  an MRI of the thoracic spine or bony pelvis would be suggested as this  is the area with the most heterogeneous uptake of the  radiopharmaceutical.  This report was finalized on 06/29/2018 16:36 by Dr. Jhoan Wells MD.    MRI Angiogram Head Without Contrast [[id number]] Collected:  06/28/18 1247     Updated:  06/28/18 1257    Narrative:          History: 77-year-old with vertigo. Headache. Cerebrovascular  malformation. No pontine brain mass.     Technique: Nonenhanced brain MRA with rotating 3-D MIP reconstructions.     Reference: MRI brain June 24, 2018.     Findings:  Motion degraded exam. No gross stenosis of the V4 segments vertebral  arteries bilaterally. Basilar artery demonstrates normal flow signal  characteristics. Proximal posterior cerebral arteries are unremarkable.  Patent bilateral posterior communicating arteries are seen.     Atherosclerotic irregularity of the intracranial internal carotid  arteries with no gross high-grade stenosis.     No gross focal stenosis of either proximal middle cerebral artery or  anterior cerebral artery. The  "right A2 KATERYNA is larger in caliber than the  left.     No brain aneurysm or vascular malformation is observed.     Redemonstrated pontine mass.          Impression:       1. Motion degraded exam. No anterior or posterior circulation occlusion  or high-grade stenosis.  2. No aneurysm or vascular malformation identified.  3. Redemonstrated pontine mass.  This report was finalized on 06/28/2018 12:54 by Dr Valentní Pearl, .            Objective:   Vitals: /69 (BP Location: Right arm, Patient Position: Lying)   Pulse 88   Temp 97.5 °F (36.4 °C) (Temporal Artery )   Resp 16   Ht 170.2 cm (67\")   Wt 91 kg (200 lb 9.6 oz)   SpO2 95%   BMI 31.42 kg/m²   Physical Exam   Constitutional: He is oriented to person, place, and time.   Frail looking.  Capable of limited self care.   HENT:   Head: Normocephalic and atraumatic.   Eyes: No scleral icterus.   Cardiovascular: Normal rate and regular rhythm.    Pulmonary/Chest: No stridor. He has no wheezes. He has no rales.   Abdominal: Soft. Bowel sounds are normal. There is no tenderness.   Musculoskeletal: He exhibits no edema.   Neurological: He is alert and oriented to person, place, and time.   Skin: There is pallor.   Psychiatric: He has a normal mood and affect. His behavior is normal. Judgment and thought content normal.   Nursing note and vitals reviewed.    24HR INTAKE/OUTPUT:  No intake or output data in the 24 hours ending 07/01/18 0796     Problem list:     Active Problems:    Acute respiratory failure    Lung neoplasm      Assessment/Plan:     PROBLEMS IDENTIFIED:  1.    Large cell neuroendocrine carcinoma.  · Stage: At least IIIb vs IV (cT4 cN2 MX or M1 - brain lesion)  · Tumor burden: 12.3 x 6.2 cm left hilar mass with extension into the anterior chest wall, left anterior upper lobe,  mass-effect on trachea at the level of the becky (mediastinal invasion); large subcarinal lymphadenopathy; right lower lobe micronodule (M1 ?), 2.3 x 2.2 cm enhancing upper " brainstem mass  · Complications of tumor: Pneumonia/consolidation of the lingula, and impending respiratory failure.  · Tumor status: On chest RT.  He is too ill for chemo.  2.    Emphysema/chronic obstructive pulmonary disease (COPD) with exacerbation related to #1.  3.    Brainstem mass, a vascular lesion such as cavernoma is favored by MRA.  4.    Reactive leukocytosis.  5.    Longstanding tobacco abuse.  6.    Abdominal aortic aneurysm.  7.    History of skin cancers.  8.    Restlessness, possibly related to hypoxemia +/- decadron.  Improved since cutting back dex to q 12 hours.  9.   Agitation due to Decadron.    10.  Poor performance status of 3.      RECOMMENDATIONS:  1.    Case discussed with patient, spouse, and nurse, Gloria.   2.    Hold chemo at this point.  He is too ill.  3.    MRA showed a vascular lesion such as cavernoma is favored. He is off Decadron.  4.    Continue general medical support.  5.    Outpatient follow-up post discharge arranged.

## 2018-07-02 NOTE — PROGRESS NOTES
[facility] Progress Note    Progress Note    Patient:  Dipesh Pruitt  YOB: 1940  Date of Service: 7/2/2018  MRN: [id number]   Acct: [id number]   Primary Care Physician: Jake Keller MD  Advance Directive:   Code Status and Medical Interventions:   Ordered at: 06/20/18 2014     Level Of Support Discussed With:    Patient    Next of Kin (If No Surrogate)     Code Status:    No CPR     Medical Interventions (Level of Support Prior to Arrest):    Full     Admit Date: 6/20/2018       Hospital Day: 12      Subjective:     Chief Compliant: Short of breath and weak.  Capable of limited self care. On vapotherm at 40%.      Review of Systems:   Review of Systems   Constitutional: Negative for chills and fever.   HENT: Negative for mouth sores and sore throat.    Eyes: Negative for pain and visual disturbance.   Respiratory: Positive for shortness of breath. Negative for wheezing.    Cardiovascular: Negative for chest pain and palpitations.   Gastrointestinal: Negative for abdominal pain, nausea and vomiting.   Genitourinary: Negative for dysuria and flank pain.   Skin: Positive for pallor.   Neurological: Negative for seizures and speech difficulty.   Hematological: Does not bruise/bleed easily.   Psychiatric/Behavioral: Negative for agitation, behavioral problems, confusion and hallucinations.           Medications:   Scheduled Meds:  atorvastatin 10 mg Oral Daily   bisacodyl 10 mg Rectal Daily   CloNIDine 1 patch Transdermal Weekly   docusate sodium 100 mg Oral BID   enoxaparin 40 mg Subcutaneous Q24H   fluconazole 100 mg Oral Q24H   hydrocortisone  Topical Q12H   ipratropium-albuterol 3 mL Nebulization Q4H - RT   nystatin  Topical Q8H       Continuous Infusions:     Labs:     Lab Results (last 72 hours)     Procedure Component Value Units Date/Time    AFB Culture - Bronchial Wash, Bronchus [[id number]]  (Normal) Collected:  06/22/18 1250    Specimen:  Bronchial Wash from Bronchus  Updated:  06/29/18 1500     AFB Culture No AFB isolated at 1 week     AFB Stain No acid fast bacilli seen on direct smear      No acid fast bacilli seen on concentrated smear            Radiology:     Imaging Results (last 72 hours)     Procedure Component Value Units Date/Time    MRI Angiogram Head With & Without Contrast [[id number]] Collected:  06/30/18 2023     Updated:  06/30/18 2040    Narrative:       MRI ANGIOGRAM HEAD W WO CONTRAST- 6/30/2018 9:48 AM CDT     HISTORY: pontine lesion. angioma vs cavernoma?   PER Dr. MCCONNELL, MUST  DO WITH CONTRAST- CONTACT HIM FOR INSTRUCTIONS IF QUESTIONS;  R13.12-Dysphagia, oropharyngeal phase; R91.8-Other nonspecific abnormal  finding of lung field; R91.8-Other nonspecific abnormal finding of lung  field; I99.9-Unspecified disorder of circulatory system; J43.8-Other  emphysema; J96.01-Acute respiratory failure with hypoxia     TECHNIQUE: Contrast-enhanced brain MRA through the [place] [facility] [place]. 3-D  and maximum intensity projection (MIP) images were created from the  axial source data.      COMPARISON: Time-of-flight MRA dated 6/28/2018, brain MRI dated  6/24/2018      FINDINGS:      Redemonstrated 2.6 cm round expansile lesion centered in the central  jennifer (series 201-image 86). On the angiogram there appear to be several  small caliber linear branching vessels within the lesion. Adjacent to  this there appears to be a fairly homogeneous enhancement throughout the  lesion, slightly less prominent than the degree of the vessel  enhancement. No other lesions are identified. The large central arteries  of the anterior and posterior circulation are normal in caliber with no  flow-limiting stenosis or aneurysm. FLAIR and T2 Star weighted pulse  sequences were also obtained with the FLAIR images showing  hyperintensity within the jennifer and cerebellar peduncles. The T2*weighted  images show heterogeneous stippled susceptibility centrally within the  lesion with a fairly continuous  rim of blooming the edge of the lesion.       Impression:       1. The imaging characteristics are certainly not pathopneumonic for a  single etiology; however, the appearance is atypical for a metastatic  lesion. No other lesions are identified. Given the appearance and  location, a vascular lesion such as cavernoma is favored. Imaging  surveillance is recommended to ensure stability.  This report was finalized on 06/30/2018 20:37 by Dr Julio Sosa, .    XR Abdomen KUB [[id number]] Collected:  06/29/18 2122     Updated:  06/29/18 2127    Narrative:       HISTORY: Questionable constipation, abdominal pain     KUB: Frontal view the abdomen is obtained.     There is moderate gas and stool of the colon.. A redundant sigmoid colon  is considered. Endovascular grafts are seen throughout the abdominal  aorta with limbs extending into the iliac arteries. There are diffuse  vascular calcifications. There are degenerative changes of the lumbar  spine.       Impression:       1. Moderate gas and stool of the colon indicating constipation.  Redundant sigmoid colon suspected. Endovascular grafts with diffuse  vascular calcification.  This report was finalized on 06/29/2018 21:24 by Dr. Nai Givens MD.    NM Pet Skull Base To Mid Thigh [[id number]] Collected:  06/29/18 1608     Updated:  06/29/18 1639    Narrative:       EXAMINATION: PET/CT skull base to mid thigh 6/29/2018     HISTORY: Non-small cell lung cancer.     DOSE: 890 mGycm. Automated exposure control was utilized to diminish  patient radiation dose..     FINDINGS: The patient's weight at the time of the procedure was 200  pounds with a blood sugar of 10 5 mg/dL. Approximately 1 hour after  injection of 8.1 mCi of F-18 FDG dedicated PET imaging was obtained from  the skull base to the proximal thighs with a nonenhanced CT obtained  simultaneously for attenuation correction.     Within the posterior margin of the right parotid gland and superficial  to the  sternocleidomastoid muscle there is a lymph node demonstrating  mild increased metabolic activity with an SUV of approximately 2.1. This  particular node could be reactive in nature. However, there is an 8 mm  node posterior to the sternocleidomastoid on the left below the level of  the mandibular angle which demonstrates intense abnormal metabolic  activity measuring SUVs of 4.1. This should be considered a metastatic  focus until otherwise proven.     There is a medial left upper lobe mass which is contiguous with the  mediastinum measuring approximately 4.3 x 4.1 cm in size. This  demonstrates a mean SUV of 9.6. Along its upper margin it is is  contiguous with a oblong soft tissue mass which also is contiguous with  and demonstrates invasion into the middle and anterior mediastinum. The  overall dimensions of this mass is approximately 10.3 x 4.5 cm in size  demonstrating a SUV of 9.5. There is also a component of the neoplasm  which is involving the anterior left upper lobe extending into the  anterior mediastinum measuring approximately 3.4 x 7.0 cm in size  demonstrating an SUV of 8.2. There is a 1.7 and a 1.8 cm left hilar node  measuring SUVs of 8.9 and 6.8 respectively. There is an enlarged  subcarinal node measuring 4.0 cm in size measuring SUVs of 9.2. There is  also a conglomerate AP window shayan mass measuring 3.8 x 5.1 cm in size  measuring SUVs of 8.1.     Within the left upper quadrant of the abdomen there is a 1.8 cm shayan  mass. This is adjacent to the tail of the pancreas insinuating itself  between the lower pole of the spleen and right kidney. This was not  present on a previous CT angiogram of the abdomen of 11/29/2016 and  measures SUVs of 6.4. This is also a metastatic shayan mass.     There is normal physiologic uptake of the radiopharmaceutical within the  urinary tract as well as some nonspecific GI tract activity. There is  heterogeneous uptake of the radiopharmaceutical within the spine  "and  bony pelvis. This may be reactive in nature without a definite discrete  CT correlate. This is particularly noted within the mid and lower  thoracic spine. If there are clinical findings suggesting bony  metastatic disease MRI could be obtained to better delineate the nature  of this process.       Impression:       1.. Left upper lobe mass which is contiguous with the mediastinum. This  extends along the anterior mediastinum into the anterior thorax forming  a large conglomerate mass. There is bulky mediastinal and hilar  adenopathy. Distant metastases are noted to the left neck and left upper  quadrant as described above.  2. Heterogeneous uptake of the radiopharmaceutical within the spine and  pelvis. This may be reactive in nature related to ongoing treatment  without a definite CT correlate identified. However, if there is a high  clinical index of suspicion for bony metastatic disease follow-up with  an MRI of the thoracic spine or bony pelvis would be suggested as this  is the area with the most heterogeneous uptake of the  radiopharmaceutical.  This report was finalized on 06/29/2018 16:36 by Dr. Jhoan Wells MD.            Objective:   Vitals: /69 (BP Location: Right arm, Patient Position: Lying)   Pulse 90   Temp 97.2 °F (36.2 °C) (Temporal Artery )   Resp 16   Ht 170.2 cm (67\")   Wt 90.6 kg (199 lb 11.2 oz)   SpO2 93%   BMI 31.28 kg/m²   Physical Exam   Constitutional: He is oriented to person, place, and time.   Frail looking.   HENT:   Head: Normocephalic and atraumatic.   Eyes: Right eye exhibits no discharge. Left eye exhibits no discharge. No scleral icterus.   Neck: Neck supple.   Cardiovascular: Normal rate and regular rhythm.    Pulmonary/Chest:   Diminished breath sounds.    Abdominal: Soft. Bowel sounds are normal. He exhibits no distension. There is no tenderness.   Musculoskeletal: He exhibits no edema.   Neurological: He is alert and oriented to person, place, and time. "   Skin: There is pallor.   Psychiatric: He has a normal mood and affect. His behavior is normal. Judgment and thought content normal.   Nursing note and vitals reviewed.    24HR INTAKE/OUTPUT:    Intake/Output Summary (Last 24 hours) at 07/02/18 0707  Last data filed at 07/01/18 1457   Gross per 24 hour   Intake              240 ml   Output              300 ml   Net              -60 ml        Problem list:     Active Problems:    Acute respiratory failure    Lung neoplasm      Assessment/Plan:     PROBLEMS IDENTIFIED:  1.    Large cell neuroendocrine carcinoma.  · Stage: At least IIIb vs IV (cT4 cN2 MX or M1 - brain lesion)  · Tumor burden: 12.3 x 6.2 cm left hilar mass with extension into the anterior chest wall, left anterior upper lobe,  mass-effect on trachea at the level of the becky (mediastinal invasion); large subcarinal lymphadenopathy; right lower lobe micronodule (M1 ?), 2.3 x 2.2 cm enhancing upper brainstem mass  · Complications of tumor: Pneumonia/consolidation of the lingula, and impending respiratory failure.  · Tumor status: On chest RT.  He is too ill for chemo.  2.    Emphysema/chronic obstructive pulmonary disease (COPD) with exacerbation related to #1.  3.    Brainstem mass, a vascular lesion such as cavernoma is favored by MRA.  For observation.   4.    Reactive leukocytosis.  5.    Longstanding tobacco abuse.  6.    Abdominal aortic aneurysm.  7.    History of skin cancers.  8.    Agitation due to Decadron.    9.   Poor performance status of 3.      RECOMMENDATIONS:  1.    Case discussed with patient, spouse, and his nurse, Gloria.   2.    Hold chemo.  He is too ill.  3.    Continue general medical support.  4.    Note of Dr. Mccarthy 07/01/2018.  Plan for brain MRI in 2 months.   5.    Outpatient follow-up post discharge arranged.

## 2018-07-02 NOTE — PROGRESS NOTES
Continued Stay Note  VERÓNICA Major     Patient Name: Dipesh Pruitt  MRN: [id number]  Today's Date: 7/2/2018    Admit Date: 6/20/2018          Discharge Plan     Row Name 07/02/18 1016       Plan    Plan LTAC?    Patient/Family in Agreement with Plan yes    Plan Comments Went to speak with wife to get update on d/c planning.  She told me her and her daughter are [facility] if pt would meet criteria.  Could we order to see?  Will follow.               Discharge Codes    No documentation.           EAN Leroy

## 2018-07-02 NOTE — PLAN OF CARE
Problem: Patient Care Overview  Goal: Plan of Care Review   07/02/18 1068   Coping/Psychosocial   Plan of Care Reviewed With patient;spouse   Plan of Care Review   Progress no change   OTHER   Outcome Summary pt had radiation tx today, back on heated high flow, having swallow study tomorrow morning. rash is clearing on abdomen. OPEP started to day per RT recommendation       Problem: Fall Risk (Adult)  Goal: Identify Related Risk Factors and Signs and Symptoms  Outcome: Ongoing (interventions implemented as appropriate)    Goal: Absence of Fall  Outcome: Ongoing (interventions implemented as appropriate)      Problem: Skin Injury Risk (Adult)  Goal: Identify Related Risk Factors and Signs and Symptoms  Outcome: Outcome(s) achieved Date Met: 07/02/18    Goal: Skin Health and Integrity  Outcome: Ongoing (interventions implemented as appropriate)

## 2018-07-02 NOTE — PLAN OF CARE
Problem: Patient Care Overview  Goal: Plan of Care Review  Outcome: Ongoing (interventions implemented as appropriate)   07/01/18 2114 07/02/18 0250   Coping/Psychosocial   Plan of Care Reviewed With patient --    Plan of Care Review   Progress --  no change   OTHER   Outcome Summary --  Patient remains on vapotherm to keep oxygen levels greater than 90%, Respiratory care providing Q4 treatments and Physiotherapy.

## 2018-07-02 NOTE — PROGRESS NOTES
Continued Stay Note   [facility]     Patient Name: Dipesh Pruitt  MRN: [id number]  Today's Date: 7/2/2018    Admit Date: 6/20/2018          Discharge Plan     Row Name 07/02/18 1356       Plan    Plan Possible LTAC    Patient/Family in Agreement with Plan yes    Plan Comments LTAC is interested in taking pt as long as radiation tx can be approved to be covered by hospital while he is there.  They are thinking they can accept tomorrow.  Informed Betsy Randolph Director of  that [facility] in radiation says pt is on number 2 of 10 tx's today. Not determined if this will be continued after this time . Chemo on hold at present. Will follow.               Discharge Codes    No documentation.           EAN Leroy

## 2018-07-02 NOTE — PLAN OF CARE
Problem: Patient Care Overview  Goal: Plan of Care Review  Outcome: Ongoing (interventions implemented as appropriate)   07/02/18 1242   Coping/Psychosocial   Plan of Care Reviewed With patient   Plan of Care Review   Progress improving   OTHER   Outcome Summary Pt. agreeable to therapy. Pt. was CGA-min x 1 for transfers. Pt. was min assist to ambulate 40ft with RW.  Pt. had LOB and seems to be unware of deficits.Pt's 02 was 94% after walking on 8L with nasal cannula.Will continue to work with pt. while he is here. Pt. would benefit from a LTAC

## 2018-07-02 NOTE — PLAN OF CARE
Problem: Patient Care Overview  Goal: Plan of Care Review  Outcome: Ongoing (interventions implemented as appropriate)   07/02/18 2870   Coping/Psychosocial   Plan of Care Reviewed With patient;family   Plan of Care Review   Progress declining   OTHER   Outcome Summary Swallow therapy completed. Upon arrival, pt anad family complained of difficulty with liquids. ST trialed honey thick , nectar thick, and thin. 3x delayed cough with nectar and 1x immediate cough with thin. No overt s/s with honey thick. ST recommends downgrading lliquids to honey thick at this time. MBS scheduled for tomorrow for a more objective look at pts swallow function. ST will continue to follow.

## 2018-07-02 NOTE — THERAPY TREATMENT NOTE
Acute Care - Physical Therapy Treatment Note  [facility]     Patient Name: Dipesh Pruitt  : 1940  MRN: [id number]  Today's Date: 2018  Onset of Illness/Injury or Date of Surgery: 18  Date of Referral to PT: 18  Referring Physician: Dr. Keller    Admit Date: 2018    Visit Dx:    ICD-10-CM ICD-9-CM   1. 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 Lung mass R91.8 786.6   4. Vascular abnormality I99.9 459.9   5. Oropharyngeal dysphagia R13.12 787.22   6. Other emphysema J43.8 492.8   7. Acute respiratory failure with hypoxia J96.01 518.81     Patient Active Problem List   Diagnosis   • Abdominal aortic aneurysm without rupture   • Tobacco abuse   • Chronic obstructive pulmonary disease   • Vascular abnormality   • Mixed hyperlipidemia   • Skin cancer   • AAA (abdominal aortic aneurysm)   • Acute respiratory failure   • Hilar mass   • Lung neoplasm       Therapy Treatment          Rehabilitation Treatment Summary     Row Name 18 1147             Treatment Time/Intention    Discipline physical therapy assistant  -AE      Document Type therapy note (daily note)  -AE      Subjective Information complains of;weakness  -AE      Existing Precautions/Restrictions fall;oxygen therapy device and L/min  -AE      Treatment Considerations/Comments --   Pt very unsteady  -AE2      Recorded by [AE] Pattie Coyle, [facility] 18 1149  [AE2] Pattie Coyle, JENNIFER 18 1241      Row Name 18 1147             Vital Signs    Pre SpO2 (%) 90  -AE      O2 Delivery Pre Treatment supplemental O2   heated high flow 55%  -AE      O2 Delivery Intra Treatment supplemental O2   8L  -AE      Post SpO2 (%) 94  -AE      O2 Delivery Post Treatment supplemental O2  -AE      Recorded by [AE] Pattie Coyle, PTA 18 1241      Row Name 18 1147             Bed Mobility Assessment/Treatment    Rolling Right [place] (Bed Mobility) independent  -AE      Recorded by [AE]  Pattie Coyle, [facility] 07/02/18 1241      Row Name 07/02/18 1147             Transfer Assessment/Treatment    Sit-Stand [place] (Transfers) minimum assist (75% patient effort);contact guard  -AE      Stand-Sit [place] (Transfers) contact guard  -AE      Recorded by [AE] Pattie Coyle, [facility] 07/02/18 1241      Row Name 07/02/18 1147             Gait/Stairs Assessment/Training    26363 - Gait Training Minutes  15  -AE      Gait/Stairs Assessment/Training gait/ambulation independence  -AE      [place] Level (Gait) moderate assist (50% patient effort);verbal cues  -AE      Assistive Device (Gait) walker, front-wheeled  -AE      Distance in Feet (Gait) 40  -AE      Deviations/Abnormal Patterns (Gait) stride length decreased;scissoring;base of support, narrow  -AE      Bilateral Gait Deviations heel strike decreased  -AE      Recorded by [AE] Pattie Coyle, [facility] 07/02/18 1241      Row Name 07/02/18 1147             Therapeutic Exercise    83472 - PT Therapeutic Exercise Minutes 8  -AE      Recorded by [AE] Pattie Coyle, [facility] 07/02/18 1241      Row Name 07/02/18 1147             Therapeutic Exercise    Lower Extremity (Therapeutic Exercise) LAQ (long arc quad), bilateral  -AE      Lower Extremity Range of Motion (Therapeutic Exercise) ankle dorsiflexion/plantar flexion, bilateral  -AE      Exercise Type (Therapeutic Exercise) AROM (active range of motion)  -AE      Position (Therapeutic Exercise) seated  -AE      Sets/Reps (Therapeutic Exercise) 10  -AE      Recorded by [AE] Pattie Coyle, [facility] 07/02/18 1241      Row Name 07/02/18 1147             Positioning and Restraints    Pre-Treatment Position in bed  -AE      Post Treatment Position chair  -AE      In Chair sitting;call light within reach  -AE      Recorded by [AE] Pattie Coyle, [facility] 07/02/18 1241      Row Name 07/02/18 1147             Pain Scale: Numbers Pre/Post-Treatment    Pain Scale: Numbers, Pretreatment 0/10 - no pain  -AE      Pain Scale:  Numbers, Post-Treatment 0/10 - no pain  -AE      Recorded by [AE] Pattie Coyle, PTA 07/02/18 1241        User Key  (r) = Recorded By, (t) = Taken By, (c) = Cosigned By    Initials Name Effective Dates Discipline    AE Pattie Coyle, PTA 06/22/15 -  PT                     Physical Therapy Education     Title: PT OT SLP Therapies (Active)     Topic: Physical Therapy (Active)     Point: Mobility training (Active)    Learning Progress Summary     Learner Status Readiness Method Response Comment Documented by    Patient Active Acceptance E NR t/f's, gait training AE 07/02/18 1310     Active Acceptance E,D NR gait and balance MF 06/28/18 1130     Active Acceptance E,D NR Educated in benefits of activity. Instructed in Safety, Bed Mobility, Transfers, Gait and Therapeutic Strengthening Exercises. Reviewed Progression of Plan of Care.  06/27/18 1533     Active Acceptance E NR Seda SANTIAGO, wgt shifting BARRY 06/24/18 1413     Done Acceptance E,TB VU Anterior weight shift with mobility in room.  Educated on posture with standing balance and weight shifting RS 06/23/18 1441    Significant Other Active Acceptance E,D NR Educated in benefits of activity. Instructed in Safety, Bed Mobility, Transfers, Gait and Therapeutic Strengthening Exercises. Reviewed Progression of Plan of Care.  06/27/18 1533          Point: Home exercise program (Active)    Learning Progress Summary     Learner Status Readiness Method Response Comment Documented by    Patient Done Acceptance E,TB VU  HA 06/27/18 1601     Active Acceptance E,D NR Educated in benefits of activity. Instructed in Safety, Bed Mobility, Transfers, Gait and Therapeutic Strengthening Exercises. Reviewed Progression of Plan of Care.  06/27/18 1533     Done Acceptance E,D VUMEGAN HEP, plan of care, benefit of activity CW 06/26/18 0945    Significant Other Active Acceptance E,D NR Educated in benefits of activity. Instructed in Safety, Bed Mobility, Transfers, Gait and  Therapeutic Strengthening Exercises. Reviewed Progression of Plan of Care.  06/27/18 1533          Point: Body mechanics (Active)    Learning Progress Summary     Learner Status Readiness Method Response Comment Documented by    Patient Active Acceptance E,D NR Educated in benefits of activity. Instructed in Safety, Bed Mobility, Transfers, Gait and Therapeutic Strengthening Exercises. Reviewed Progression of Plan of Care.  06/27/18 1533     Done Acceptance E,D VU,DU HEP, plan of care, benefit of activity  06/26/18 0945    Significant Other Active Acceptance E,D NR Educated in benefits of activity. Instructed in Safety, Bed Mobility, Transfers, Gait and Therapeutic Strengthening Exercises. Reviewed Progression of Plan of Care.  06/27/18 1533          Point: Precautions (Active)    Learning Progress Summary     Learner Status Readiness Method Response Comment Documented by    Patient Active Acceptance E,D NR Educated in benefits of activity. Instructed in Safety, Bed Mobility, Transfers, Gait and Therapeutic Strengthening Exercises. Reviewed Progression of Plan of Care.  06/27/18 1533    Significant Other Active Acceptance E,D NR Educated in benefits of activity. Instructed in Safety, Bed Mobility, Transfers, Gait and Therapeutic Strengthening Exercises. Reviewed Progression of Plan of Care.  06/27/18 1533                      User Key     Initials Effective Dates Name Provider Type Discipline    AE 06/22/15 -  Pattie Coyle, PTA Physical Therapy Assistant PT    LG 08/02/16 -  Mayank Pederson, PTA Physical Therapy Assistant PT    BARRY 08/02/16 -  Libby Alves, PTA Physical Therapy Assistant PT     06/22/15 -  Lynne Pearce, PTA Physical Therapy Assistant PT    MF 08/02/16 -  Bibi Kolb, PTA Physical Therapy Assistant PT    RS 06/19/18 -  Ryan Jackson, PT, DPT, OCS Physical Therapist PT    PADILLA 08/02/16 -  Sadie Mcclain, RN Registered Nurse Nurse                    PT  Recommendation and Plan     Plan of Care Reviewed With: patient  Progress: improving  Outcome Summary: Pt. agreeable to therapy. Pt. was CGA for transfers. Pt. was min assist plus another person for handling equipment. Pt. walked 50' x 3 with stops to correct his balance which is very poor. Pt. had LOB and seems to be unware of deficits. Will continue to work with pt. while he is here. Pt. would be benefit from further therapy prior to returning home due to safety.           Outcome Measures     Row Name 07/02/18 1147             How much help from another person do you currently need...    Turning from your back to your side while in flat bed without using bedrails? 4  -AE      Moving from lying on back to sitting on the side of a flat bed without bedrails? 4  -AE      Moving to and from a bed to a chair (including a wheelchair)? 3  -AE      Standing up from a chair using your arms (e.g., wheelchair, bedside chair)? 3  -AE      Climbing 3-5 steps with a railing? 2  -AE      To walk in hospital room? 2  -AE      AM-PAC 6 Clicks Score 18  -AE         Functional Assessment    Outcome Measure Options AM-PAC 6 Clicks Basic Mobility (PT)  -AE        User Key  (r) = Recorded By, (t) = Taken By, (c) = Cosigned By    Initials Name Provider Type    RYLEY Coyle PTA Physical Therapy Assistant           Time Calculation:         PT Charges     Row Name 07/02/18 1311 07/02/18 1147          Time Calculation    Start Time 1147  -AE  --     Stop Time 1210  -AE  --     Time Calculation (min) 23 min  -AE  --     PT Received On 07/02/18  -AE  --     PT Goal Re-Cert Due Date 07/03/18  -AE  --        Time Calculation- PT    Total Timed Code Minutes- PT 23 minute(s)  -AE  --        Timed Charges    40333 - PT Therapeutic Exercise Minutes 8  -AE 8  -AE     54392 - Gait Training Minutes  15  -AE 15  -AE       User Key  (r) = Recorded By, (t) = Taken By, (c) = Cosigned By    Initials Name Provider Type    RYLEY Coyle PTA  Physical Therapy Assistant        Therapy Suggested Charges     Code   Minutes Charges    51276 (CPT®) Hc Pt Neuromusc Re Education Ea 15 Min      50927 (CPT®) Hc Pt Ther Proc Ea 15 Min 8 1    81598 (CPT®) Hc Gait Training Ea 15 Min 15 1    78636 (CPT®) Hc Pt Therapeutic Act Ea 15 Min      04812 (CPT®) Hc Pt Manual Therapy Ea 15 Min      39975 (CPT®) Hc Pt Iontophoresis Ea 15 Min      14305 (CPT®) Hc Pt Elec Stim Ea-Per 15 Min      25354 (CPT®) Hc Pt Ultrasound Ea 15 Min      62068 (CPT®) Hc Pt Self Care/Mgmt/Train Ea 15 Min      Total  23 2        Therapy Charges for Today     Code Description Service Date Service Provider Modifiers Qty    24751602875 HC GAIT TRAINING EA 15 MIN 7/2/2018 Pattie Coyle, PTA GP 1    56916483443 HC PT THER PROC EA 15 MIN 7/2/2018 Pattie Coyle, PTA GP 1          PT G-Codes  Outcome Measure Options: AM-PAC 6 Clicks Basic Mobility (PT)  Score: 20  Functional Limitation: Mobility: Walking and moving around  Mobility: Walking and Moving Around Current Status (): At least 20 percent but less than 40 percent impaired, limited or restricted  Mobility: Walking and Moving Around Goal Status (): At least 1 percent but less than 20 percent impaired, limited or restricted    Pattie Coyle, PTA  7/2/2018

## 2018-07-02 NOTE — THERAPY TREATMENT NOTE
Acute Care - Speech Language Pathology   Swallow Treatment Note [facility]     Patient Name: Dipesh Pruitt  : 1940  MRN: [id number]  Today's Date: 2018  Onset of Illness/Injury or Date of Surgery: 18     Referring Physician: Dr. Keller      Admit Date: 2018  Swallow therapy completed. Upon arrival, pt anad family complained of difficulty with liquids. ST trialed honey thick , nectar thick, and thin. 3x delayed cough with nectar and 1x immediate cough with thin. No overt s/s with honey thick. ST recommends downgrading liquids to honey thick at this time. MBS scheduled for tomorrow for a more objective look at pts swallow function. ST will continue to follow.   JORDAN Daniels 2018 4:11 PM    Visit Dx:      ICD-10-CM ICD-9-CM   1. 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 Lung mass R91.8 786.6   4. Vascular abnormality I99.9 459.9   5. Oropharyngeal dysphagia R13.12 787.22   6. Other emphysema (CMS/HCC) J43.8 492.8   7. Acute respiratory failure with hypoxia (CMS/HCC) J96.01 518.81     Patient Active Problem List   Diagnosis   • Abdominal aortic aneurysm without rupture (CMS/HCC)   • Tobacco abuse   • Chronic obstructive pulmonary disease (CMS/HCC)   • Vascular abnormality   • Mixed hyperlipidemia   • Skin cancer   • AAA (abdominal aortic aneurysm) (CMS/HCC)   • Acute respiratory failure (CMS/HCC)   • Hilar mass   • Lung neoplasm       Therapy Treatment    Therapy Treatment / Health Promotion    Treatment Time/Intention  Discipline: speech language pathologist (18 1535 : JORDAN Daniels)  Document Type: therapy note (daily note) (18 1535 : JORDAN Daniels)  Subjective Information: complains of (diff with swallowing liquids) (18 1535 : JORDAN Daniels)  Mode of Treatment: individual therapy, speech-language pathology (18 1535 : JORDAN Daniels)  Patient/Family Observations:  Family present  (07/02/18 1535 : JORDAN Daniels)  Care Plan Review: care plan/treatment goals reviewed, patient/other agree to care plan (07/02/18 1535 : JORDAN Daniels)  Care Plan Review, Other Participant(s): family (07/02/18 1535 : JORDAN Daniels)  Therapy Frequency (Swallow): at least, 3 days per week (07/02/18 1535 : JORDAN Daniels)  Patient Effort: good (07/02/18 1535 : JORDAN Daniels)  Plan of Care Review  Plan of Care Reviewed With: patient, family (07/02/18 1608 : Diane L Trinh, CCC-SLP)    Vitals/Pain/Safety  Pain Assessment  Additional Documentation: Pain Scale: FACES Pre/Post-Treatment (Group) (07/02/18 1535 : JORDAN Daniels)  Pain Scale: FACES Pre/Post-Treatment  Pain: FACES Scale, Pretreatment: 2-->hurts little bit (07/02/18 1535 : JORDAN Daniels)  Pain: FACES Scale, Post-Treatment: 2-->hurts little bit (07/02/18 1535 : JORDAN Daniels)    Cognition, Communication, Swallow  Recommendations  Therapy Frequency (Swallow): at least, 3 days per week (07/02/18 1535 : JORDAN Daniels)  Anticipated Dischage Disposition: inpatient rehabilitation facility (07/02/18 1535 : JORDAN Daniels)    Outcome Summary  Outcome Summary/Treatment Plan (SLP)  Daily Summary of Progress (SLP): progress toward functional goals is gradual (07/02/18 1535 : JORDAN Daniels)  Barriers to Overall Progress (SLP): Medically complex (07/02/18 1535 : JORDAN Daniels)  Plan for Continued Treatment (SLP): Downgraded to HT liquids. Order MBS for maria c.  (07/02/18 1535 : JORDAN Daniels)  Anticipated Dischage Disposition: inpatient rehabilitation facility (07/02/18 1535 : Diane Trinh CCC-SLP)            SLP GOALS     Row Name 07/02/18 1535             Oral Nutrition/Hydration Goal 1 (SLP)    Oral Nutrition/Hydration Goal 1, SLP Pt will toelrate least restrictive diet w/o  any overt s/s of aspiration.  -MM      Time Frame (Oral Nutrition/Hydration Goal 1, SLP) by discharge  -MM      Barriers (Oral Nutrition/Hydration Goal 1, SLP) n/a  -MM      Progress/Outcomes (Oral Nutrition/Hydration Goal 1, SLP) progress slower than expected  -MM         Lingual Strengthening Goal 1 (SLP)    Activity (Lingual Strengthening Goal 1, SLP) increase lingual tone/sensation/control/coordination/movement  -MM      Increase Lingual Tone/Sensation/Control/Coordination/Movement lingual movement exercises  -MM      [place]/Accuracy (Lingual Strengthening Goal 1, SLP) with minimal cues (75-90% accuracy)  -MM      Time Frame (Lingual Strengthening Goal 1, SLP) by discharge  -MM      Barriers (Lingual Strengthening Goal 1, SLP) n/a  -MM      Progress/Outcomes (Lingual Strengthening Goal 1, SLP) goal ongoing  -MM         Pharyngeal Strengthening Exercise Goal 1 (SLP)    Activity (Pharyngeal Strengthening Goal 1, SLP) increase squeeze/positive pressure generation;increase superior movement of the hyolaryngeal complex  -MM      Increase Superior Movement of the Hyolaryngeal Complex Mendelsohn  -MM      Increase Squeeze/Positive Pressure Generation effortful pitch glide (falsetto + pharyngeal squeeze);sanjay  -MM      [place]/Accuracy (Pharyngeal Strengthening Goal 1, SLP) with minimal cues (75-90% accuracy)  -MM      Time Frame (Pharyngeal Strengthening Goal 1, SLP) by discharge  -MM      Barriers (Pharyngeal Strengthening Goal 1, SLP) n/a  -MM      Progress/Outcomes (Pharyngeal Strengthening Goal 1, SLP) goal ongoing  -MM        User Key  (r) = Recorded By, (t) = Taken By, (c) = Cosigned By    Initials Name Provider Type    LAST Trinh [facility]-SLP Speech Therapist          EDUCATION  The patient has been educated in the following areas:   Dysphagia (Swallowing Impairment).    SLP Recommendation and Plan                       Anticipated Dischage Disposition: inpatient rehabilitation facility      Therapy Frequency (Swallow): at least, 3 days per week          Plan of Care Reviewed With: patient, family  Plan of Care Review  Plan of Care Reviewed With: patient, family  Daily Summary of Progress (SLP): progress toward functional goals is gradual  Plan for Continued Treatment (SLP): Downgraded to HT liquids. Order MBS for maria c.   Progress: declining  Outcome Summary: Swallow therapy completed. Upon arrival, pt pattd family complained of difficulty with liquids. ST trialed honey thick , nectar thick, and thin. 3x delayed cough with nectar and 1x immediate cough with thin. No overt s/s with honey thick. ST recommends downgrading lliquids to honey thick at this time. MBS scheduled for tomorrow for a more objective look at pts swallow function. ST will continue to follow.            Time Calculation:         Time Calculation- SLP     Row Name 07/02/18 1610             Time Calculation- SLP    SLP Start Time 1535  -MM      SLP Stop Time 1558  -MM      SLP Time Calculation (min) 23 min  -MM      SLP Received On 07/02/18  -MM        User Key  (r) = Recorded By, (t) = Taken By, (c) = Cosigned By    Initials Name Provider Type    MM JORDAN Daniels Speech Therapist          Therapy Charges for Today     Code Description Service Date Service Provider Modifiers Qty    07296642413 [facility] TREATMENT SWALLOW 2 7/2/2018 JORDAN Daniels GN 1          SLP G-Codes  SLP NOMS Used?: Yes  Functional Limitations: Swallowing  Swallow Current Status (): At least 40 percent but less than 60 percent impaired, limited or restricted  Swallow Goal Status (): At least 40 percent but less than 60 percent impaired, limited or restricted      JORDAN Daniels  7/2/2018

## 2018-07-03 NOTE — THERAPY EVALUATION
"Acute Care - Speech Language Pathology   Swallow Re-Evaluation [facility]     Patient Name: Dipesh Pruitt  : 1940  MRN: [id number]  Today's Date: 7/3/2018  Onset of Illness/Injury or Date of Surgery: 18     Referring Physician: Dr. Keller      Admit Date: 2018     SPEECH-LANGUAGE PATHOLOGY EVALUATION - SWALLOW  Subjective: The patient was seen on this date for a Clinical Swallow evaluation.  Patient was alert and cooperative for PO trials, with mod cues.  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  Assessment: Re-eval of swallow fx completed at bedside this AM, s/p unresponsive event this AM. Pt was fatigued at time of eval, yet easily awakened to speech, with persistent, generalized weakness noted. Hoarse, harsh, weak vocal quality with poor speech intelligibility. Wife present and stated current level of alertness and deconditioning significant increased in comparison to overall state on 2018. Wife stated pt requested PO prior to ST visit and was presented pudding per wife. Stated she observed no obvious concerns with aspiration, as pt appeared to tolerate presented trials. For ST eval, pt was presented pureed and honey thick trials. Pt was noted to have 2x vocal quality change and 2x verbalization of \"that was a little harder to swallow\", each of which were following the honey thick consistency. Educated wife on concerns noted at this time in re: to poor respiratory support, weak vocal quality, and fatigue, all of which increase his risk for aspiration at this time. Wife agreed with concerns, and verablized understanding that ST cannot fully r/o aspiration at this time. It is felt the pt is also more susceptible to silent aspiration at this time secondary to weak vocal quality. Staff to monitor for increased concerns with toleration of PO diet.   SLP Findings:  Patient presents with moderate " to severe oropharyngeal dysphagia, without esophageal component.   Recommendations: Diet Textures: pudding thick liquid, puree consistency food. If concerns noted with toleration of recommended diet, ST recommends to hold PO intake and notify MD for further instructions until ST can re-assess. Medications should be taken crushed with puree. May have ice between meals after oral care, under staff or family supervision and with the recommended strategies for safe swallowing.   Recommended Strategies: Upright for PO, small bites and sips and supervision with all PO. Oral care before breakfast, after all meals and PRN.  Other Recommended Evaluations: VFSS when pt more appropriate to leave floor for testing  Dysphagia therapy is recommended and will be continued at this time. Rationale: See above. Thanks!   Nguyen Ramos, CCC-SLP 7/3/2018 3:05 PM    Visit Dx:     ICD-10-CM ICD-9-CM   1. 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 Lung mass R91.8 786.6   4. Vascular abnormality I99.9 459.9   5. Oropharyngeal dysphagia R13.12 787.22   6. Other emphysema (CMS/HCC) J43.8 492.8   7. Acute respiratory failure with hypoxia (CMS/HCC) J96.01 518.81     Patient Active Problem List   Diagnosis   • Abdominal aortic aneurysm without rupture (CMS/HCC)   • Tobacco abuse   • Chronic obstructive pulmonary disease (CMS/HCC)   • Vascular abnormality   • Mixed hyperlipidemia   • Skin cancer   • AAA (abdominal aortic aneurysm) (CMS/HCC)   • Acute respiratory failure (CMS/HCC)   • Hilar mass   • Lung neoplasm     Past Medical History:   Diagnosis Date   • AAA (abdominal aortic aneurysm) (CMS/HCC)    • Aneurysm (CMS/HCC)     AAA   • COPD (chronic obstructive pulmonary disease) (CMS/HCC)    • Full dentures    • Skin cancer     MULTIPLE   • Tobacco abuse    • Vision decreased     GLASSES     Past Surgical History:   Procedure Laterality Date   • BRONCHOSCOPY Left 6/22/2018    Procedure: BRONCHOSCOPY WITH ENDOBRONCHIAL  ULTRASOUND;  Surgeon: Morgan Obando MD;  Location:  [facility] OR;  Service: Pulmonary   • CARDIOVASCULAR STRESS TEST  10/07/2016   • MO AAA REPAIR,AORTO-AORTIC TUBE PROSTH N/A 10/19/2016    Procedure: ABDOMINAL AORTIC ANEURYSM REPAIR WITH ENDOGRAFT;  Surgeon: Reilly Rosado DO;  Location:  [facility] OR;  Service: Vascular   • SKIN BIOPSY     • SKIN CANCER EXCISION     • UMBILICAL HERNIA REPAIR            SWALLOW EVALUATION (last 72 hours)      SLP Adult Swallow Evaluation     Row Name 07/03/18 1315                   Rehab Evaluation    Document Type re-evaluation  -TM        Subjective Information weakness;fatigue  -TM        Patient Observations cooperative  -TM        Patient/Family Observations Wife present at time of eval.  -TM        Patient Effort fair  -TM           General Information    Patient Profile Reviewed yes  -TM        Pertinent History Of Current Problem CXR 07/03/2018 showed no radiographic evidence of acute process.  -TM        Current Method of Nutrition soft textures;honey-thick liquids  -TM        Precautions/Limitations, Vision vision impairment, bilaterally  -TM        Precautions/Limitations, Hearing WFL  -TM        Prior Level of Function-Communication cognitive-linguistic impairment  -TM        Prior Level of Function-Swallowing mechanical soft textures;nectar thick liquids  -TM        Plans/Goals Discussed with spouse/S.O.;other (see comments)   Lulú BALLARD  -        Barriers to Rehab medically complex  -TM        Patient's Goals for Discharge patient did not state  -TM        Family Goals for Discharge family did not state  -TM           Pain Assessment    Additional Documentation Pain Scale: Numbers Pre/Post-Treatment (Group)  -TM           Pain Scale: Numbers Pre/Post-Treatment    Pain Scale: Numbers, Pretreatment 0/10 - no pain  -TM        Pain Scale: Numbers, Post-Treatment 5/10  -TM        Pain Location back  -TM        Pre/Post Treatment Pain Comment Repositioned by  lowering head of bed  -TM           Oral Motor and Function    Dentition Assessment edentulous, does not have dentures;other (see comments)   Dentures removed per staff this AM per RN report  -TM        Secretion Management dried secretions in oral cavity  -TM        Mucosal Quality dry;cracked  -TM        Volitional Swallow weak  -TM        Volitional Cough non-productive;weak;reduced respiratory support  -TM           Oral Musculature and Cranial Nerve Assessment    Oral Motor General Assessment generalized oral motor weakness  -TM        Mandibular Impairment Detail, Cranial Nerve V (Trigeminal) reduced mandibular ROM;reduced strength bilaterally  -TM        Oral Labial or Buccal Impairment, Detail, Cranial Nerve VII (Facial): reduced ROM;reduced strength bilaterally  -TM        Lingual Impairment, Detail. Cranial Nerves IX, XII (Glossopharyngeal and Hypoglossal) reduced lingual ROM;reduced strength  -TM        Vocal Impairment, Detail. Cranial Nerve X (Vagus) vocal quality abnormality (see comments);other (see comments)   Hoarse, harsh, weak vocal quality, false vocal fold use  -TM           General Eating/Swallowing Observations    Respiratory Support Currently in Use nasal cannula;other (see comments)   High flow  -TM        Eating/Swallowing Skills fed by SLP  -TM        Positioning During Eating upright 90 degree;upright in bed  -TM        Utensils Used spoon  -TM        Consistencies Trialed pudding thick;honey-thick liquids  -TM           Clinical Swallow Eval    Oral Prep Phase impaired  -TM        Oral Transit impaired  -TM        Oral Residue WFL  -TM        Pharyngeal Phase suspected pharyngeal impairment  -TM        Esophageal Phase unremarkable  -TM        Clinical Swallow Evaluation Summary Re-eval of swallow fx completed at bedside this AM, s/p unresponsive event this AM. Pt was fatigued at time of eval, yet easily awakened to speech, with persistent, generalized weakness noted. Hoarse, harsh, weak  "vocal quality with poor speech intelligibility. Wife present and stated current level of alertness and deconditioning significant increased in comparison to overall state on 07/02/2018. Wife stated pt requested PO prior to ST visit and was presented pudding per wife. Stated she observed no obvious concerns with aspiration, as pt appeared to tolerate presented trials. For ST eval, pt was presented pureed and honey thick trials. Pt was noted to have 2x vocal quality change and 2x verbalization of \"that was a little harder to swallow\", each of which were following the honey thick consistency. Educated wife on concerns noted at this time in re: to poor respiratory support, weak vocal quality, and fatigue, all of which increase his risk for aspiration at this time. Wife agreed with concerns, and verablized understanding that ST cannot fully r/o aspiration at this time. It is felt the pt is also more susceptible to silent aspiration at this time secondary to weak vocal quality. Staff to monitor for increased concerns with toleration of PO diet.   -TM           Oral Prep Concerns    Oral Prep Concerns incomplete or weak lip closure around spoon;anterior loss  -TM        Incomplete or Weak Lip Closure Around Spoon pudding;honey  -TM        Anterior Loss honey  -TM           Oral Transit Concerns    Oral Transit Concerns increased oral transit time  -TM        Increased Oral Transit Time pudding;honey  -TM           Pharyngeal Phase Concerns    Pharyngeal Phase Concerns cough;wet vocal quality  -TM        Wet Vocal Quality honey  -TM        Cough honey  -TM           Clinical Impression    SLP Swallowing Diagnosis mod-severe;oral dysfunction;pharyngeal dysfunction  -TM        Functional Impact risk of aspiration/pneumonia;risk of dehydration;risk of malnutrition  -TM        Rehab Potential/Prognosis, Swallowing re-evaluate goals as necessary  -TM        Criteria for Skilled Therapeutic Interventions Met demonstrates skilled " criteria  -TM           Recommendations    Therapy Frequency (Swallow) at least;3 days per week  -TM        Predicted Duration Therapy Intervention (Days) until discharge  -TM        SLP Diet Recommendation puree;pudding thick liquids;ice chips between meals after oral care, with supervision  -TM        Recommended Diagnostics VFSS (MBS);other (see comments)   When more alert/appropriate.  -TM        Recommended Precautions and Strategies upright posture during/after eating;small bites of food and sips of liquid  -TM        SLP Rec. for Method of Medication Administration meds crushed;with pudding or applesauce  -TM        Monitor for Signs of Aspiration yes;cough;gurgly voice;throat clearing;pneumonia;right lower lobe infiltrates  -TM        Anticipated Dischage Disposition unknown  -TM           Swallow Goals (SLP)    Oral Nutrition/Hydration Goal Selection (SLP) oral nutrition/hydration, SLP goal 1  -TM        Lingual Strengthening Goal Selection (SLP) lingual strengthening, SLP goal 1  -TM        Pharyngeal Strengthening Exercise Goal Selection (SLP) pharyngeal strengthening exercise, SLP goal 1  -TM        Additional Documentation lingual strengthening goal selection (SLP);pharyngeal strengthening exercise goal selection (SLP)  -TM           Oral Nutrition/Hydration Goal 1 (SLP)    Oral Nutrition/Hydration Goal 1, SLP Pt will toelrate least restrictive diet w/o any overt s/s of aspiration.  -TM        Time Frame (Oral Nutrition/Hydration Goal 1, SLP) by discharge  -TM        Barriers (Oral Nutrition/Hydration Goal 1, SLP) Weakness   -TM        Progress/Outcomes (Oral Nutrition/Hydration Goal 1, SLP) goal ongoing  -TM           Lingual Strengthening Goal 1 (SLP)    Activity (Lingual Strengthening Goal 1, SLP) increase lingual tone/sensation/control/coordination/movement  -TM        Increase Lingual Tone/Sensation/Control/Coordination/Movement lingual movement exercises  -TM        [place]/Accuracy (Lingual  Strengthening Goal 1, SLP) with minimal cues (75-90% accuracy)  -TM        Time Frame (Lingual Strengthening Goal 1, SLP) by discharge  -TM        Barriers (Lingual Strengthening Goal 1, SLP) Weakness  -TM        Progress/Outcomes (Lingual Strengthening Goal 1, SLP) goal ongoing  -TM           Pharyngeal Strengthening Exercise Goal 1 (SLP)    Activity (Pharyngeal Strengthening Goal 1, SLP) increase squeeze/positive pressure generation;increase superior movement of the hyolaryngeal complex  -TM        Increase Superior Movement of the Hyolaryngeal Complex Mendelsohn  -TM        Increase Squeeze/Positive Pressure Generation effortful pitch glide (falsetto + pharyngeal squeeze);sanjay  -TM        [place]/Accuracy (Pharyngeal Strengthening Goal 1, SLP) with minimal cues (75-90% accuracy)  -TM        Time Frame (Pharyngeal Strengthening Goal 1, SLP) by discharge  -TM        Barriers (Pharyngeal Strengthening Goal 1, SLP) Weakness  -TM        Progress/Outcomes (Pharyngeal Strengthening Goal 1, SLP) goal ongoing  -TM          User Key  (r) = Recorded By, (t) = Taken By, (c) = Cosigned By    Initials Name Effective Dates    TM Nguyen Ramos CCC-SLP 08/02/16 -         EDUCATION  The patient has been educated in the following areas:   Dysphagia (Swallowing Impairment).    SLP Recommendation and Plan  SLP Swallowing Diagnosis: mod-severe, oral dysfunction, pharyngeal dysfunction  SLP Diet Recommendation: puree, pudding thick liquids, ice chips between meals after oral care, with supervision  Recommended Precautions and Strategies: upright posture during/after eating, small bites of food and sips of liquid     Monitor for Signs of Aspiration: yes, cough, gurgly voice, throat clearing, pneumonia, right lower lobe infiltrates  Recommended Diagnostics: VFSS (MBS), other (see comments) (When more alert/appropriate.)  Criteria for Skilled Therapeutic Interventions Met: demonstrates skilled criteria  Anticipated Dischage  "Disposition: unknown  Rehab Potential/Prognosis, Swallowing: re-evaluate goals as necessary  Therapy Frequency (Swallow): at least, 3 days per week  Predicted Duration Therapy Intervention (Days): until discharge       Plan of Care Reviewed With: spouse, other (see comments) (Lulú BALLARD)  Plan of Care Review  Plan of Care Reviewed With: spouse, other (see comments) (Lulú BALLARD)  Progress:  (Re-eval)  Outcome Summary: Re-eval of swallow fx completed at bedside this AM, s/p unresponsive event this AM. Pt was fatigued at time of eval, yet easily awakened to speech, with persistent, generalized weakness noted. Hoarse, harsh, weak vocal quality with poor speech intelligibility. Wife present and stated current level of alertness and deconditioning significant increased in comparison to overall state on 07/02/2018. Wife stated pt requested PO prior to ST visit and was presented pudding per wife. Stated she observed no obvious concerns with aspiration, as pt appeared to tolerate presented trials. For ST eval, pt was presented pureed and honey thick trials. Pt was noted to have 2x vocal quality change and 2x verbalization of \"that was a little harder to swallow\", each of which were following the honey thick consistency. Educated wife on concerns noted at this time in re: to poor respiratory support, weak vocal quality, and fatigue, all of which increase his risk for aspiration at this time. Wife agreed with concerns, and verablized understanding that ST cannot fully r/o aspiration at this time. It is felt the pt is also more susceptible to silent aspiration at this time secondary to weak vocal quality. Staff to monitor for increased concerns with toleration of PO diet. RECOMMENDATIONS: Downgrade to pureed diet consistency with pudding thick liquid consistency; meds crushed in pudding/applesauce; ok for occasional ice chips in between meals, being at least 30 minutes following any other PO intake; meds crushed (if safe " to be crushed) in pudding/applesauce; if concerns noted with toleration of recommended diet, notify MD for further instructions until ST can re-assess. ST to continue to follow and tx. Thanks!           SLP GOALS     Row Name 07/03/18 1315 07/02/18 6825          Oral Nutrition/Hydration Goal 1 (SLP)    Oral Nutrition/Hydration Goal 1, SLP Pt will toelrate least restrictive diet w/o any overt s/s of aspiration.  -TM Pt will toelrate least restrictive diet w/o any overt s/s of aspiration.  -MM     Time Frame (Oral Nutrition/Hydration Goal 1, SLP) by discharge  -TM by discharge  -MM     Barriers (Oral Nutrition/Hydration Goal 1, SLP) Weakness   -TM n/a  -MM     Progress/Outcomes (Oral Nutrition/Hydration Goal 1, SLP) goal ongoing  -TM progress slower than expected  -MM        Lingual Strengthening Goal 1 (SLP)    Activity (Lingual Strengthening Goal 1, SLP) increase lingual tone/sensation/control/coordination/movement  -TM increase lingual tone/sensation/control/coordination/movement  -MM     Increase Lingual Tone/Sensation/Control/Coordination/Movement lingual movement exercises  -TM lingual movement exercises  -MM     [place]/Accuracy (Lingual Strengthening Goal 1, SLP) with minimal cues (75-90% accuracy)  -TM with minimal cues (75-90% accuracy)  -MM     Time Frame (Lingual Strengthening Goal 1, SLP) by discharge  -TM by discharge  -MM     Barriers (Lingual Strengthening Goal 1, SLP) Weakness  -TM n/a  -MM     Progress/Outcomes (Lingual Strengthening Goal 1, SLP) goal ongoing  -TM goal ongoing  -MM        Pharyngeal Strengthening Exercise Goal 1 (SLP)    Activity (Pharyngeal Strengthening Goal 1, SLP) increase squeeze/positive pressure generation;increase superior movement of the hyolaryngeal complex  -TM increase squeeze/positive pressure generation;increase superior movement of the hyolaryngeal complex  -MM     Increase Superior Movement of the Hyolaryngeal Complex Mendelsohn  -TM Mendelsohn  -MM     Increase  Squeeze/Positive Pressure Generation effortful pitch glide (falsetto + pharyngeal squeeze);sanjay  -TM effortful pitch glide (falsetto + pharyngeal squeeze);sanjay  -MM     [place]/Accuracy (Pharyngeal Strengthening Goal 1, SLP) with minimal cues (75-90% accuracy)  -TM with minimal cues (75-90% accuracy)  -MM     Time Frame (Pharyngeal Strengthening Goal 1, SLP) by discharge  -TM by discharge  -MM     Barriers (Pharyngeal Strengthening Goal 1, SLP) Weakness  -TM n/a  -MM     Progress/Outcomes (Pharyngeal Strengthening Goal 1, SLP) goal ongoing  -TM goal ongoing  -MM       User Key  (r) = Recorded By, (t) = Taken By, (c) = Cosigned By    Initials Name Provider Type    TM Nguyen Ramos CCC-SLP Speech and Language Pathologist    MM Diane Trinh CCC-SLP Speech Therapist             SLP Outcome Measures (last 72 hours)      SLP Outcome Measures     Row Name 07/03/18 1501             SLP Outcome Measures    Outcome Measure Used? Adult NOMS  -TM         FCM Scores    FCM Chosen Swallowing  -TM      Swallowing FCM Score 3  -TM        User Key  (r) = Recorded By, (t) = Taken By, (c) = Cosigned By    Initials Name Effective Dates    TM YARELIS VargasSLP 08/02/16 -            Time Calculation:         Time Calculation- SLP     Row Name 07/03/18 1501             Time Calculation- SLP    SLP Start Time 1315  -TM      SLP Stop Time 1445  -TM      SLP Time Calculation (min) 90 min  -TM      SLP Received On 07/03/18  -TM      SLP Goal Re-Cert Due Date 07/13/18  -TM        User Key  (r) = Recorded By, (t) = Taken By, (c) = Cosigned By    Initials Name Provider Type    TM JORDAN Vargas Speech and Language Pathologist          Therapy Charges for Today     Code Description Service Date Service Provider Modifiers Qty    17649496284 HC ST SWALLOWING CURRENT STATUS 7/3/2018 JORDAN Vargas GN, CL 1    31841594914 HC ST SWALLOWING PROJECTED 7/3/2018 JORDAN Vargas GN, CK 1    [id number]   ST EVAL ORAL PHARYNG SWALLOW 6 7/3/2018 Nguyen Ramos CCC-SLP GN 1          SLP G-Codes  SLP NOMS Used?: Yes  Functional Limitations: Swallowing  Swallow Current Status (): At least 60 percent but less than 80 percent impaired, limited or restricted  Swallow Goal Status (): At least 40 percent but less than 60 percent impaired, limited or restricted    JORDAN Rodney  7/3/2018

## 2018-07-03 NOTE — PROGRESS NOTES
Wife is at bedside shaving the patient--did walk with pt today    Review of Systems   Constitutional: Positive for appetite change and fatigue.   HENT: Negative.    Eyes: Negative.    Respiratory: Positive for cough and shortness of breath.    Cardiovascular: Negative.    Gastrointestinal: Negative.    Endocrine: Negative.    Genitourinary: Negative.    Musculoskeletal: Negative.    Skin: Negative.    Allergic/Immunologic: Negative.    Neurological: Negative.    Hematological: Negative.    Psychiatric/Behavioral: Negative.      Temp:  [97.4 °F (36.3 °C)-98.1 °F (36.7 °C)] 97.4 °F (36.3 °C)  Heart Rate:  [80-91] 83  Resp:  [16-18] 16  BP: ()/(56-69) 105/63  I/O last 3 completed shifts:  In: 240 [P.O.:240]  Out: 600 [Urine:600]  No intake/output data recorded.    Physical Exam   Constitutional: He appears well-developed and well-nourished.   HENT:   Head: Normocephalic.   Eyes: Pupils are equal, round, and reactive to light.   Neck: Normal range of motion. Neck supple.   Cardiovascular: Normal rate, regular rhythm, normal heart sounds and intact distal pulses.    Pulmonary/Chest: Effort normal. He has wheezes.   Abdominal: Soft. Bowel sounds are normal.   Musculoskeletal: Normal range of motion.   Neurological: He is alert.   Skin: Skin is warm. Capillary refill takes less than 2 seconds.   Psychiatric: He has a normal mood and affect. His behavior is normal.   Nursing note and vitals reviewed.      Active Problems:    Acute respiratory failure (CMS/HCC)    Lung neoplasm    Hopeful for LTAC placement

## 2018-07-03 NOTE — PROGRESS NOTES
[facility] Progress Note    Progress Note    Patient:  Dipesh Pruitt  YOB: 1940  Date of Service: 7/3/2018  MRN: [id number]   Acct: [id number]   Primary Care Physician: Jake Keller MD  Advance Directive:   Code Status and Medical Interventions:   Ordered at: 06/20/18 2014     Level Of Support Discussed With:    Patient    Next of Kin (If No Surrogate)     Code Status:    No CPR     Medical Interventions (Level of Support Prior to Arrest):    Full     Admit Date: 6/20/2018       Hospital Day: 13      Subjective:     Chief Compliant: Short of air.  Seen with spouse. Remains weak.       Review of Systems:   Review of Systems   Constitutional: Positive for fatigue. Negative for chills and fever.   HENT: Negative for nosebleeds and sore throat.    Eyes: Negative for visual disturbance.   Respiratory: Positive for shortness of breath.    Cardiovascular: Negative for chest pain and palpitations.   Gastrointestinal: Negative for abdominal pain, nausea and vomiting.   Genitourinary: Negative for dysuria and flank pain.   Skin: Positive for pallor.   Neurological: Negative for tremors, seizures and speech difficulty.   Hematological: Does not bruise/bleed easily.   Psychiatric/Behavioral: Negative for agitation, confusion and hallucinations.           Medications:   Scheduled Meds:  atorvastatin 10 mg Oral Daily   bisacodyl 10 mg Rectal Daily   CloNIDine 1 patch Transdermal Weekly   docusate sodium 100 mg Oral BID   enoxaparin 40 mg Subcutaneous Q24H   fluconazole 100 mg Oral Q24H   hydrocortisone  Topical Q12H   ipratropium-albuterol 3 mL Nebulization Q4H - RT   nystatin  Topical Q8H       Continuous Infusions:     Labs:     Lab Results (last 72 hours)     ** No results found for the last 72 hours. **            Radiology:     Imaging Results (last 72 hours)     Procedure Component Value Units Date/Time    MRI Angiogram Head With & Without Contrast [[id number]] Collected:   06/30/18 2023     Updated:  06/30/18 2040    Narrative:       MRI ANGIOGRAM HEAD W WO CONTRAST- 6/30/2018 9:48 AM CDT     HISTORY: pontine lesion. angioma vs cavernoma?   PER Dr. MCCONNELL, MUST  DO WITH CONTRAST- CONTACT HIM FOR INSTRUCTIONS IF QUESTIONS;  R13.12-Dysphagia, oropharyngeal phase; R91.8-Other nonspecific abnormal  finding of lung field; R91.8-Other nonspecific abnormal finding of lung  field; I99.9-Unspecified disorder of circulatory system; J43.8-Other  emphysema; J96.01-Acute respiratory failure with hypoxia     TECHNIQUE: Contrast-enhanced brain MRA through the [place] [facility] [place]. 3-D  and maximum intensity projection (MIP) images were created from the  axial source data.      COMPARISON: Time-of-flight MRA dated 6/28/2018, brain MRI dated  6/24/2018      FINDINGS:      Redemonstrated 2.6 cm round expansile lesion centered in the central  jennifer (series 201-image [id number]). On the angiogram there appear to be several  small caliber linear branching vessels within the lesion. Adjacent to  this there appears to be a fairly homogeneous enhancement throughout the  lesion, slightly less prominent than the degree of the vessel  enhancement. No other lesions are identified. The large central arteries  of the anterior and posterior circulation are normal in caliber with no  flow-limiting stenosis or aneurysm. FLAIR and T2 Star weighted pulse  sequences were also obtained with the FLAIR images showing  hyperintensity within the jennifer and cerebellar peduncles. The T2*weighted  images show heterogeneous stippled susceptibility centrally within the  lesion with a fairly continuous rim of blooming the edge of the lesion.       Impression:       1. The imaging characteristics are certainly not pathopneumonic for a  single etiology; however, the appearance is atypical for a metastatic  lesion. No other lesions are identified. Given the appearance and  location, a vascular lesion such as cavernoma is favored.  "Imaging  surveillance is recommended to ensure stability.  This report was finalized on 06/30/2018 20:37 by Dr Julio Sosa, .            Objective:   Vitals: /63 (BP Location: Left arm, Patient Position: Lying)   Pulse 81   Temp 97.4 °F (36.3 °C) (Temporal Artery )   Resp 18   Ht 170.2 cm (67\")   Wt 89.5 kg (197 lb 6.4 oz)   SpO2 96%   BMI 30.92 kg/m²   Physical Exam   Constitutional: He is oriented to person, place, and time.   Frail looking.   HENT:   Head: Normocephalic and atraumatic.   Eyes: No scleral icterus.   Cardiovascular: Normal rate and regular rhythm.    Pulmonary/Chest: No stridor. He has no wheezes.   Abdominal: Soft. Bowel sounds are normal. He exhibits no distension. There is no tenderness.   Musculoskeletal: He exhibits no edema.   Neurological: He is alert and oriented to person, place, and time.   Skin: There is pallor.   Psychiatric: He has a normal mood and affect. His behavior is normal. Judgment and thought content normal.   Nursing note and vitals reviewed.    24HR INTAKE/OUTPUT:    Intake/Output Summary (Last 24 hours) at 07/03/18 0708  Last data filed at 07/02/18 1115   Gross per 24 hour   Intake                0 ml   Output              300 ml   Net             -300 ml        Problem list:     Active Problems:    Acute respiratory failure (CMS/HCC)    Lung neoplasm      Assessment/Plan:     PROBLEMS IDENTIFIED:  1.    Large cell neuroendocrine carcinoma.  · Stage: At least IIIb vs IV (cT4 cN2 MX or M1 - brain lesion)  · Tumor burden: 12.3 x 6.2 cm left hilar mass with extension into the anterior chest wall, left anterior upper lobe,  mass-effect on trachea at the level of the becky (mediastinal invasion); large subcarinal lymphadenopathy; right lower lobe micronodule (M1 ?), 2.3 x 2.2 cm enhancing upper brainstem mass  · Complications of tumor: Pneumonia/consolidation of the lingula, and impending respiratory failure.  · Tumor status: On chest RT.  He is too ill for " chemo.  2.    Emphysema/chronic obstructive pulmonary disease (COPD) with exacerbation related to #1.  3.    Brainstem mass, a vascular lesion such as cavernoma is favored by MRA.  For observation.   4.    Reactive leukocytosis.  5.    Longstanding tobacco abuse.  6.    Abdominal aortic aneurysm.  7.    History of skin cancers.  8.    Agitation due to Decadron.    9.   Poor performance status of 3.      RECOMMENDATIONS:  1.    Case discussed with patient, spouse, and his nurse, Esperanza.   2.    Hold chemo.  He is too ill.  Continue radiation by Dr. Lovell.   3.    Continue general medical support.  4.    Plan to transfer to LTAC.  5.    Outpatient follow-up post discharge arranged.  6.     Will sign off.  Please call if needed.

## 2018-07-03 NOTE — PLAN OF CARE
Problem: Patient Care Overview  Goal: Plan of Care Review  Outcome: Ongoing (interventions implemented as appropriate)   07/02/18 1613 07/03/18 0528   Coping/Psychosocial   Plan of Care Reviewed With patient;spouse --    Plan of Care Review   Progress no change --    OTHER   Outcome Summary --  pt for swallow study today. possible discharge to LTACH soon. heated high flow in place with sats in upper 90's. wife at bedside. bedcheck on as pt attempts to get oob unassisted.      Goal: Individualization and Mutuality  Outcome: Ongoing (interventions implemented as appropriate)    Goal: Discharge Needs Assessment  Outcome: Ongoing (interventions implemented as appropriate)    Goal: Interprofessional Rounds/Family Conf  Outcome: Ongoing (interventions implemented as appropriate)      Problem: Fall Risk (Adult)  Goal: Identify Related Risk Factors and Signs and Symptoms  Outcome: Ongoing (interventions implemented as appropriate)    Goal: Absence of Fall  Outcome: Ongoing (interventions implemented as appropriate)      Problem: Skin Injury Risk (Adult)  Goal: Skin Health and Integrity  Outcome: Ongoing (interventions implemented as appropriate)

## 2018-07-03 NOTE — SIGNIFICANT NOTE
Called dr jo to update all orders and status of patient. He is in agreeance to orders from hospitalist

## 2018-07-03 NOTE — NURSING NOTE
Patient/family refused meds to swallow. Pt had RRT called this AM for unresponsiveness. He is not tolerating swallowing even thickened liquids. He was supposed to also have radiation today that was cancelled due to pt status at this time. CT without contrast was ordered. Pending review by physicians.

## 2018-07-03 NOTE — PLAN OF CARE
"Problem: Patient Care Overview  Goal: Plan of Care Review  Outcome: Ongoing (interventions implemented as appropriate)   07/03/18 3824   Coping/Psychosocial   Plan of Care Reviewed With spouse;other (see comments)  (RNLulú)   Plan of Care Review   Progress (Re-eval)   OTHER   Outcome Summary Re-eval of swallow fx completed at bedside this AM, s/p unresponsive event this AM. Pt was fatigued at time of eval, yet easily awakened to speech, with persistent, generalized weakness noted. Hoarse, harsh, weak vocal quality with poor speech intelligibility. Wife present and stated current level of alertness and deconditioning significant increased in comparison to overall state on 07/02/2018. Wife stated pt requested PO prior to ST visit and was presented pudding per wife. Stated she observed no obvious concerns with aspiration, as pt appeared to tolerate presented trials. For ST eval, pt was presented pureed and honey thick trials. Pt was noted to have 2x vocal quality change and 2x verbalization of \"that was a little harder to swallow\", each of which were following the honey thick consistency. Educated wife on concerns noted at this time in re: to poor respiratory support, weak vocal quality, and fatigue, all of which increase his risk for aspiration at this time. Wife agreed with concerns, and verablized understanding that ST cannot fully r/o aspiration at this time. It is felt the pt is also more susceptible to silent aspiration at this time secondary to weak vocal quality. Staff to monitor for increased concerns with toleration of PO diet. RECOMMENDATIONS: Downgrade to pureed diet consistency with pudding thick liquid consistency; meds crushed in pudding/applesauce; ok for occasional ice chips in between meals, being at least 30 minutes following any other PO intake; meds crushed (if safe to be crushed) in pudding/applesauce; if concerns noted with toleration of recommended diet, notify MD for further instructions " until ST can re-assess. ST to continue to follow and tx. Thanks!

## 2018-07-03 NOTE — PLAN OF CARE
Problem: Patient Care Overview  Goal: Plan of Care Review  Outcome: Ongoing (interventions implemented as appropriate)   07/03/18 1413   Coping/Psychosocial   Plan of Care Reviewed With patient;family   Plan of Care Review   Progress no change   OTHER   Outcome Summary Pt very SOB today; tells me his appetite is very poor. Family tells me he hasn't been swallowing well. Diet currently Pureed, PT. RDN added magic cup TID, as Pt thinks cold stuff is easier. Encouraged intake and will continue to follow up.       Problem: Nutrition, Imbalanced: Inadequate Oral Intake (Adult)  Goal: Identify Related Risk Factors and Signs and Symptoms  Outcome: Outcome(s) achieved Date Met: 07/03/18 07/03/18 1413   Nutrition, Imbalanced: Inadequate Oral Intake (Adult)   Related Risk Factors (Nutrition Imbalance, Inadequate Oral Intake) chronic illness/infection;appetite decreased;eating difficulty   Signs and Symptoms (Nutrition Imbalance, Inadequate Oral Intake: Signs and Symptoms) satiety early;weakness/lethargy     Goal: Improved Oral Intake  Outcome: Ongoing (interventions implemented as appropriate)    Goal: Prevent Further Weight Loss  Outcome: Ongoing (interventions implemented as appropriate)

## 2018-07-03 NOTE — PLAN OF CARE
Problem: Patient Care Overview  Goal: Plan of Care Review  Outcome: Ongoing (interventions implemented as appropriate)   07/03/18 4231   Coping/Psychosocial   Plan of Care Reviewed With patient;spouse;daughter   Plan of Care Review   Progress declining   OTHER   Outcome Summary pt was unresponsive this AM. RRT called, blood gasses obtained. No evidence per hospitalist of respiratory distress. Called Dr. Keller during rapid response to update him. Labs and CT of head obtained. Patient has returned back to his orientation and arousability baseline. He was downgraded by speech on his diet. He was also able to travel to radiation treatment after ok'd by MD. He has had no other issues today other than declining weakness,. Wife has been at bedside. Anticipating Dr. Keller to go over CT of head from this morning.       Problem: Fall Risk (Adult)  Goal: Absence of Fall  Outcome: Ongoing (interventions implemented as appropriate)      Problem: Skin Injury Risk (Adult)  Goal: Skin Health and Integrity  Outcome: Ongoing (interventions implemented as appropriate)      Problem: Nutrition, Imbalanced: Inadequate Oral Intake (Adult)  Goal: Improved Oral Intake  Outcome: Ongoing (interventions implemented as appropriate)    Goal: Prevent Further Weight Loss  Outcome: Ongoing (interventions implemented as appropriate)

## 2018-07-04 PROBLEM — R40.4 TRANSIENT ALTERATION OF AWARENESS: Status: ACTIVE | Noted: 2018-01-01

## 2018-07-04 NOTE — PLAN OF CARE
Problem: Patient Care Overview  Goal: Plan of Care Review  Outcome: Ongoing (interventions implemented as appropriate)   07/04/18 0319   Coping/Psychosocial   Plan of Care Reviewed With patient   Plan of Care Review   Progress declining   OTHER   Outcome Summary Patient is oriented to self only. Seems to be lethargic/drowsy and too weak to cough up secretions. Scopolamine patches applied behind both ears. Deep suction done by respiratory therapist. Patient less gurgly after deep suctioning. Not taking much in by mouth. Cont to monitor.        Problem: Fall Risk (Adult)  Goal: Identify Related Risk Factors and Signs and Symptoms  Outcome: Ongoing (interventions implemented as appropriate)    Goal: Absence of Fall  Outcome: Ongoing (interventions implemented as appropriate)      Problem: Skin Injury Risk (Adult)  Goal: Skin Health and Integrity  Outcome: Ongoing (interventions implemented as appropriate)      Problem: Nutrition, Imbalanced: Inadequate Oral Intake (Adult)  Goal: Improved Oral Intake  Outcome: Ongoing (interventions implemented as appropriate)    Goal: Prevent Further Weight Loss  Outcome: Ongoing (interventions implemented as appropriate)

## 2018-07-04 NOTE — PROGRESS NOTES
RN called rapid response due to unresponsiveness.  Went examined patient bedside.  Patient response to pain stimuli but does not answer questions or response to verbal commands.  Vital stable.  Ordered stat CT head.  Ordered stat ABG.  Order stat labs.  Patient will be transferred ICU.  Informed RN to call Dr. Keller who is the primary care for this patient.  Informed RN to report back if any other acute findings.

## 2018-07-04 NOTE — PROGRESS NOTES
Dipesh had an episode of unresponsiveness this am--no witness seizure or apnea --nursing staff state telemetry was unremarkable--he eventually returned to baseline mental status --labs and ct scan of head unrevealing    Review of Systems   Constitutional: Positive for appetite change and fatigue.   HENT: Negative.    Eyes: Negative.    Respiratory: Positive for cough and shortness of breath.    Cardiovascular: Negative.    Gastrointestinal: Negative.    Endocrine: Negative.    Genitourinary: Negative.    Musculoskeletal: Negative.    Skin: Negative.    Allergic/Immunologic: Negative.    Neurological: Positive for weakness.   Hematological: Negative.      Temp:  [97.1 °F (36.2 °C)-98.5 °F (36.9 °C)] 97.1 °F (36.2 °C)  Heart Rate:  [80-95] 92  Resp:  [18-20] 20  BP: (103-128)/(58-76) 121/70  I/O last 3 completed shifts:  In: -   Out: 25 [Urine:25]  No intake/output data recorded.    Physical Exam   Constitutional: He appears well-developed and well-nourished.   HENT:   Head: Normocephalic and atraumatic.   Right Ear: External ear normal.   Left Ear: External ear normal.   Nose: Nose normal.   Mouth/Throat: Oropharynx is clear and moist.   Eyes: Conjunctivae and EOM are normal. Pupils are equal, round, and reactive to light.   Neck: Normal range of motion. Neck supple.   Cardiovascular: Normal rate, regular rhythm, normal heart sounds and intact distal pulses.    Pulmonary/Chest: Effort normal.   Abdominal: Soft. Bowel sounds are normal.   Musculoskeletal: Normal range of motion.   Neurological: He is alert.   Skin: Skin is warm. Capillary refill takes less than 2 seconds.   Psychiatric: He has a normal mood and affect. His behavior is normal.   Nursing note and vitals reviewed.      Active Problems:    Acute respiratory failure (CMS/HCC)    Lung neoplasm    Transient alteration of awareness    Long discussion with the daughter and spouse?apnea ?seizure ?post ictal --we wanted to consult neurology but they are not  available for the next 5 days--I discussed transfer with the family but they declined due to rad tx and want to continue to monitor for now

## 2018-07-04 NOTE — PROGRESS NOTES
Dipesh had another episodes of unresponsiveness this am and moved into the CCU--no witness seizure or chest pain ?apnea(he does have hx of patricia according to his wife)--now getting more hypoxic despite use of bipap--nursing staff report family ready for comfort care now    Review of Systems   Constitutional: Positive for activity change, appetite change and fatigue.   HENT: Positive for trouble swallowing and voice change.    Eyes: Negative.    Respiratory: Positive for shortness of breath and wheezing.    Cardiovascular: Negative.    Gastrointestinal: Negative.    Endocrine: Negative.    Genitourinary: Negative.    Musculoskeletal: Negative.    Skin: Negative.      Temp:  [97.1 °F (36.2 °C)-99.2 °F (37.3 °C)] 99.2 °F (37.3 °C)  Heart Rate:  [] 112  Resp:  [16-20] 16  BP: ()/(58-79) 103/68  FiO2 (%):  [80 %-100 %] 80 %  I/O last 3 completed shifts:  In: -   Out: 25 [Urine:25]  No intake/output data recorded.    Physical Exam   Constitutional: He appears well-developed and well-nourished. He appears distressed.   HENT:   Head: Normocephalic and atraumatic.   Right Ear: External ear normal.   Left Ear: External ear normal.   Nose: Nose normal.   Mouth/Throat: Oropharynx is clear and moist.   Eyes: Conjunctivae and EOM are normal. Pupils are equal, round, and reactive to light.   Neck: Normal range of motion. Neck supple.   Cardiovascular: Normal rate, regular rhythm, normal heart sounds and intact distal pulses.    Pulmonary/Chest: Effort normal.   Abdominal: Soft. Bowel sounds are normal.   Musculoskeletal: Normal range of motion.   Skin: Skin is warm and dry. Capillary refill takes less than 2 seconds.   Psychiatric: He has a normal mood and affect.   Nursing note and vitals reviewed.      Active Problems:    Acute respiratory failure (CMS/HCC)    Lung neoplasm    Transient alteration of awareness    Will inititiate comfort care orders per family wishes to nursing staff

## 2018-07-04 NOTE — SIGNIFICANT NOTE
RRT on floor - transfer to CCU. Spo2 94% on 50% vmsk for transfer. Report to Steph RT  Derrell, RRT

## 2018-07-04 NOTE — SIGNIFICANT NOTE
Attempted recheck for diet toleration. Unable to complete due to recent change in medical status.      07/04/18 1025   Time Calculation- SLP   SLP Start Time 0900   SLP Stop Time 0920   SLP Time Calculation (min) 20 min   SLP Non-Billable Time (min) 20 min  (Unable to see patient. Non responsive)

## 2018-07-04 NOTE — PLAN OF CARE
Problem: Patient Care Overview  Goal: Plan of Care Review   07/04/18 1712   Coping/Psychosocial   Plan of Care Reviewed With patient;family   Plan of Care Review   Progress declining   OTHER   Outcome Summary Pt unresponsive this A.M. RRT called and sent pt to CCU. Pt is not to be intubated or have CPR. 0830 Pt able to follow commands, squeezes hands and wiggles toes. Intermittently goes in and out of consciousness. 1210 Pt 02 sat began to decline on the Vapotherm. Switched pt to Bipap, then C-pap, then AVAP. Pt currently at 100% O2, . Not awake enough to take po. Dr. Keller has ordered Ativan and Morphine for comfort if needed.       Goal: Individualization and Mutuality  Outcome: Ongoing (interventions implemented as appropriate)      Problem: Fall Risk (Adult)  Goal: Identify Related Risk Factors and Signs and Symptoms  Outcome: Ongoing (interventions implemented as appropriate)    Goal: Absence of Fall  Outcome: Ongoing (interventions implemented as appropriate)      Problem: Skin Injury Risk (Adult)  Goal: Skin Health and Integrity  Outcome: Ongoing (interventions implemented as appropriate)      Problem: Nutrition, Imbalanced: Inadequate Oral Intake (Adult)  Goal: Improved Oral Intake  Outcome: Ongoing (interventions implemented as appropriate)    Goal: Prevent Further Weight Loss  Outcome: Ongoing (interventions implemented as appropriate)

## 2018-07-05 NOTE — NURSING NOTE
Patient presents with a deep tissue injury on medial nose.  Injury is the result of use of a BiPAP.  Patient was placed on Vapotherm, but required use of BiPAP so returned to using. Base of wound is purple.  Edges are irregular.  No drainage is present.  Bilateral sides of nose are red and blanchable.      Foam dressing (No adhesive) was used under mask to pad surface.  Suggest to continue treatment to lessen pressure on this area until he can have mask removed.  Mask was loosened to point seal would still be intact.  No other needs at this time.  Will continue to follow.

## 2018-07-05 NOTE — SIGNIFICANT NOTE
Spoke with RN who said patient not appropriate today.  On bipap  Marge Mcmullen MS CCC-SLP 7/5/2018 2:31 PM     07/05/18 1431   Rehab Treatment   Discipline speech language pathologist

## 2018-07-05 NOTE — PROGRESS NOTES
Continued Stay Note   [facility]     Patient Name: Dipesh Pruitt  MRN: [id number]  Today's Date: 7/5/2018    Admit Date: 6/20/2018          Discharge Plan     Row Name 07/05/18 0848       Plan    Plan LTAC referral was started on floor.      Plan Comments Patient now in ICU.  LTAC referral was started on medical floor.  Will follow patient's progress to determine appropriate level of care.              Discharge Codes    No documentation.           MINERVA Valdivia

## 2018-07-06 NOTE — PLAN OF CARE
Problem: Patient Care Overview  Goal: Plan of Care Review  Outcome: Ongoing (interventions implemented as appropriate)   07/06/18 5668   Coping/Psychosocial   Plan of Care Reviewed With patient   Plan of Care Review   Progress no change   OTHER   Outcome Summary Patient non verbal, responds to commands and opens eyes. No s/s of pain. Bladder scanned and showed patient was retentioning urine. In and out cath'd with 300 output. VSS. Will continue to monitor.        Problem: Fall Risk (Adult)  Goal: Identify Related Risk Factors and Signs and Symptoms  Outcome: Outcome(s) achieved Date Met: 07/06/18    Goal: Absence of Fall  Outcome: Ongoing (interventions implemented as appropriate)      Problem: Skin Injury Risk (Adult)  Goal: Skin Health and Integrity  Outcome: Ongoing (interventions implemented as appropriate)      Problem: Nutrition, Imbalanced: Inadequate Oral Intake (Adult)  Goal: Improved Oral Intake  Outcome: Ongoing (interventions implemented as appropriate)    Goal: Prevent Further Weight Loss  Outcome: Ongoing (interventions implemented as appropriate)

## 2018-07-06 NOTE — PROGRESS NOTES
Continued Stay Note  VERÓNICA Major     Patient Name: Dipesh Pruitt  MRN: [id number]  Today's Date: 7/6/2018    Admit Date: 6/20/2018          Discharge Plan     Row Name 07/06/18 0948       Plan    Plan Referral to [facility]    Patient/Family in Agreement with Plan yes    Plan Comments Order as family interested in pt going to [facility] at discharge.  Spoke with family in room and this confirmed, they wish for referral to be given. Informed Mel Sullivan of referral [phone].              Discharge Codes    No documentation.           EAN Leroy

## 2018-07-06 NOTE — NURSING NOTE
Spoke with family and family requested to change code status and interventions to comfort measures. Called MD and made aware of pt families requests. Removed Bipap and placed pt on nasal cannula. Will continue to monitor. Zehra Pearce RN

## 2018-07-06 NOTE — THERAPY DISCHARGE NOTE
Acute Care - Physical Therapy Progress Note/Discharge  [facility]     Patient Name: Dipesh Pruitt  : 1940  MRN: [id number]  Today's Date: 2018  Onset of Illness/Injury or Date of Surgery: 18  Date of Referral to PT: 18  Referring Physician: Dr. Keller    Admit Date: 2018    Visit Dx:    ICD-10-CM ICD-9-CM   1. 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 Lung mass R91.8 786.6   4. Vascular abnormality I99.9 459.9   5. Oropharyngeal dysphagia R13.12 787.22   6. Other emphysema (CMS/HCC) J43.8 492.8   7. Acute respiratory failure with hypoxia (CMS/HCC) J96.01 518.81     Patient Active Problem List   Diagnosis   • Abdominal aortic aneurysm without rupture (CMS/HCC)   • Tobacco abuse   • Chronic obstructive pulmonary disease (CMS/HCC)   • Vascular abnormality   • Mixed hyperlipidemia   • Skin cancer   • AAA (abdominal aortic aneurysm) (CMS/HCC)   • Acute respiratory failure (CMS/HCC)   • Hilar mass   • Lung neoplasm   • Transient alteration of awareness       Physical Therapy Education     Title: PT OT SLP Therapies (Done)     Topic: Physical Therapy (Done)     Point: Mobility training (Resolved)    Learning Progress Summary     Learner Status Readiness Method Response Comment Documented by    Patient Active Acceptance E NR t/f's, gait training AE 18 1310     Active Acceptance E,D NR gait and balance MF 18 1130     Active Acceptance E,D NR Educated in benefits of activity. Instructed in Safety, Bed Mobility, Transfers, Gait and Therapeutic Strengthening Exercises. Reviewed Progression of Plan of Care. LG 18 1533     Active Acceptance E NR Seda SANTIAGO wgt shifting BARRY 18 1413     Done Acceptance E,TB VU Anterior weight shift with mobility in room.  Educated on posture with standing balance and weight shifting RS 18 1441    Significant Other Active Acceptance E,D NR Educated in benefits of activity. Instructed in Safety, Bed Mobility,  Transfers, Gait and Therapeutic Strengthening Exercises. Reviewed Progression of Plan of Care.  06/27/18 1533          Point: Home exercise program (Resolved)    Learning Progress Summary     Learner Status Readiness Method Response Comment Documented by    Patient Done Acceptance E,TB VU  HA 06/27/18 1601     Active Acceptance E,D NR Educated in benefits of activity. Instructed in Safety, Bed Mobility, Transfers, Gait and Therapeutic Strengthening Exercises. Reviewed Progression of Plan of Care.  06/27/18 1533     Done Acceptance E,D VU,DU HEP, plan of care, benefit of activity  06/26/18 0945    Significant Other Active Acceptance E,D NR Educated in benefits of activity. Instructed in Safety, Bed Mobility, Transfers, Gait and Therapeutic Strengthening Exercises. Reviewed Progression of Plan of Care.  06/27/18 1533          Point: Body mechanics (Resolved)    Learning Progress Summary     Learner Status Readiness Method Response Comment Documented by    Patient Active Acceptance E,D NR Educated in benefits of activity. Instructed in Safety, Bed Mobility, Transfers, Gait and Therapeutic Strengthening Exercises. Reviewed Progression of Plan of Care.  06/27/18 1533     Done Acceptance E,D VUMEGAN HEP, plan of care, benefit of activity  06/26/18 0945    Significant Other Active Acceptance E,D NR Educated in benefits of activity. Instructed in Safety, Bed Mobility, Transfers, Gait and Therapeutic Strengthening Exercises. Reviewed Progression of Plan of Care.  06/27/18 1533          Point: Precautions (Resolved)    Learning Progress Summary     Learner Status Readiness Method Response Comment Documented by    Patient Active Acceptance E,D NR Educated in benefits of activity. Instructed in Safety, Bed Mobility, Transfers, Gait and Therapeutic Strengthening Exercises. Reviewed Progression of Plan of Care.  06/27/18 1533    Significant Other Active Acceptance E,D NR Educated in benefits of activity. Instructed  in Safety, Bed Mobility, Transfers, Gait and Therapeutic Strengthening Exercises. Reviewed Progression of Plan of Care. LG 06/27/18 1533                      User Key     Initials Effective Dates Name Provider Type Discipline    AE 06/22/15 -  Pattie Coyle, PTA Physical Therapy Assistant PT    LG 08/02/16 -  Mayank Pederson, PTA Physical Therapy Assistant PT    BARRY 08/02/16 -  Libby Alves, PTA Physical Therapy Assistant PT    CW 06/22/15 -  Lynne Pearce, PTA Physical Therapy Assistant PT    MF 08/02/16 -  Bibi Kolb, PTA Physical Therapy Assistant PT    RS 06/19/18 -  Ryan Jackson, PT, DPT, OCS Physical Therapist PT    PADILLA 08/02/16 -  Sadie Mcclain, RN Registered Nurse Nurse                    PT Rehab Goals     Row Name 07/06/18 1340             Transfer Goal 1 (PT)    [place] Level/Cues Needed (Transfer Goal 1, PT) minimum assist (75% or more patient effort)   goal: Independent  -BARRY      Progress/Outcome (Transfer Goal 1, PT) goal not met   Pt. now comfort measures  -BARRY         Gait Training Goal 1 (PT)    [place] Level (Gait Training Goal 1, PT) contact guard assist   goal Independent  -BARRY      Distance (Gait Goal 1, PT) 40   goal 150'  -BARRY      Progress/Outcome (Gait Training Goal 1, PT) goal not met  -BARRY         Stairs Goal 1 (PT)    [place] Level/Cues Needed (Stairs Goal 1, PT) other (see comments)   goal: Independent. Not attempted  -BARRY      Number of Stairs (Stairs Goal 1, PT) --   goal 2  -BARRY      Progress/Outcome (Stairs Goal 1, PT) goal not met  -BARRY        User Key  (r) = Recorded By, (t) = Taken By, (c) = Cosigned By    Initials Name Provider Type Discipline    BARRY Libby Alves, [facility] Physical Therapy Assistant PT        Therapy Treatment        Rehabilitation Treatment Summary     Row Name                Wound 07/05/18 0800 Bilateral nose pressure injury    Wound - Properties Group Date first assessed: 07/05/18 [WG] Time first assessed: 0800 [WG]  Present On Admission : no [WG] Side: Bilateral [WG] Location: nose [WG] Type: pressure injury [HS] Stage, Pressure Injury: deep tissue injury [WG] Recorded by:  [HS] Gloria Lindquist RN 07/06/18 0737 [WG] Ivett Choe RN 07/05/18 1736      User Key  (r) = Recorded By, (t) = Taken By, (c) = Cosigned By    Initials Name Effective Dates Discipline     Ivett Choe RN 02/03/17 -  Nurse    HS Gloria Lindquist RN 12/27/16 -  Nurse        Wound 07/05/18 0800 Bilateral nose pressure injury (Active)   Dressing Appearance moist drainage 7/6/2018  8:00 AM   Closure Open to air 7/6/2018  8:00 AM   Base maroon/purple 7/6/2018  8:00 AM   Dressing Care, Wound open to air 7/6/2018  8:00 AM       PT Recommendation and Plan  Anticipated Discharge Disposition (PT): other (see comments) (hospice facility)  Outcome Summary/Treatment Plan (PT)  Anticipated Discharge Disposition (PT): other (see comments) (hospice facility)  Plan of Care Reviewed With: patient  Outcome Summary: Pt. is independent in bed obility. Cga for sit to stand to sit. Minimal assist in ambulating 80'. Has slight LOB laterally and posteriorly. Will benefit from strengthening and balance activities.         Time Calculation:     Therapy Suggested Charges     Code   Minutes Charges    69912 (CPT®) Hc Pt Neuromusc Re Education Ea 15 Min      21332 (CPT®) Hc Pt Ther Proc Ea 15 Min 8 1    94020 (CPT®) Hc Gait Training Ea 15 Min 15 1    42006 (CPT®) Hc Pt Therapeutic Act Ea 15 Min      49770 (CPT®) Hc Pt Manual Therapy Ea 15 Min      86556 (CPT®) Hc Pt Iontophoresis Ea 15 Min      03001 (CPT®) Hc Pt Elec Stim Ea-Per 15 Min      79415 (CPT®) Hc Pt Ultrasound Ea 15 Min      99470 (CPT®) Hc Pt Self Care/Mgmt/Train Ea 15 Min      Total  23 2              PT G-Codes  Outcome Measure Options: AM-PAC 6 Clicks Basic Mobility (PT)  Score: 20  Functional Limitation: Mobility: Walking and moving around  Mobility: Walking and Moving Around Current Status (): At  least 20 percent but less than 40 percent impaired, limited or restricted  Mobility: Walking and Moving Around Goal Status (): At least 1 percent but less than 20 percent impaired, limited or restricted    PT Discharge Summary  Anticipated Discharge Disposition (PT): other (see comments) (hospice facility)  Reason for Discharge: Change in medical status  Outcomes Achieved: Unable to make functional progress toward goals at this time  Discharge Destination: other (comment) (hospice facility)    Libby Alves, PTA  7/6/2018

## 2018-07-06 NOTE — PLAN OF CARE
Problem: Patient Care Overview  Goal: Plan of Care Review   07/06/18 1531   Coping/Psychosocial   Plan of Care Reviewed With patient   Plan of Care Review   Progress declining   OTHER   Outcome Summary Pt code status changed to comfort measures this shift. Cheema cath placed for acute retention. Bipap dc'd and pt placed on nasal cannula. PRN meds available for comfort. Family at bedside and spoke with hospice care this shift. Will continue to monitor.      Goal: Individualization and Mutuality  Outcome: Ongoing (interventions implemented as appropriate)    Goal: Discharge Needs Assessment  Outcome: Ongoing (interventions implemented as appropriate)    Goal: Interprofessional Rounds/Family Conf  Outcome: Ongoing (interventions implemented as appropriate)      Problem: Fall Risk (Adult)  Goal: Absence of Fall  Outcome: Ongoing (interventions implemented as appropriate)      Problem: Skin Injury Risk (Adult)  Goal: Skin Health and Integrity  Outcome: Ongoing (interventions implemented as appropriate)      Problem: Nutrition, Imbalanced: Inadequate Oral Intake (Adult)  Goal: Improved Oral Intake  Outcome: Ongoing (interventions implemented as appropriate)    Goal: Prevent Further Weight Loss  Outcome: Ongoing (interventions implemented as appropriate)      Problem: Dying Patient, Actively (Adult)  Goal: Identify Related Risk Factors and Signs and Symptoms  Outcome: Ongoing (interventions implemented as appropriate)    Goal: Comfort/Pain Control  Outcome: Ongoing (interventions implemented as appropriate)    Goal: Peace/Preservation of Dignity During the Dying Process  Outcome: Ongoing (interventions implemented as appropriate)

## 2018-07-06 NOTE — PLAN OF CARE
Problem: Patient Care Overview  Goal: Plan of Care Review  Outcome: Unable to achieve outcome(s) by discharge Date Met: 07/06/18 07/06/18 1323   Plan of Care Review   Progress no change   OTHER   Outcome Summary RD folow-up completed. Pt is now for comfort measures and plans are for pt to go to a hospice center after hospital d/c. Nothing further to add unless aggressive nutritional tx is desired

## 2018-07-07 NOTE — THERAPY DISCHARGE NOTE
Acute Care - Physical Therapy Discharge Summary  [facility]       Patient Name: Dipesh Pruitt  : 1940  MRN: [id number]    Today's Date: 2018  Onset of Illness/Injury or Date of Surgery: 18    Date of Referral to PT: 18  Referring Physician: Dr. Keller      Admit Date: 2018      PT Recommendation and Plan    Visit Dx:    ICD-10-CM ICD-9-CM   1. 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 Lung mass R91.8 786.6   4. Vascular abnormality I99.9 459.9   5. Oropharyngeal dysphagia R13.12 787.22   6. Other emphysema (CMS/[facility]) J43.8 492.8   7. Acute respiratory failure with hypoxia (CMS/[facility]) J96.01 518.81             Therapy Suggested Charges     Code   Minutes Charges    96891 (CPT®) Hc Pt Neuromusc Re Education Ea 15 Min      83334 (CPT®) Hc Pt Ther Proc Ea 15 Min 8 1    42005 (CPT®) Hc Gait Training Ea 15 Min 15 1    61317 (CPT®) Hc Pt Therapeutic Act Ea 15 Min      03071 (CPT®) Hc Pt Manual Therapy Ea 15 Min      80255 (CPT®) Hc Pt Iontophoresis Ea 15 Min      79667 (CPT®) Hc Pt Elec Stim Ea-Per 15 Min      86557 (CPT®) Hc Pt Ultrasound Ea 15 Min      10433 (CPT®) Hc Pt Self Care/Mgmt/Train Ea 15 Min      Total  23 2                PT Rehab Goals     Row Name 18 0904             Transfer Goal 1 (PT)    Activity/Assistive Device (Transfer Goal 1, PT) transfers, all  -MF      [place] Level/Cues Needed (Transfer Goal 1, PT) independent  -MF      Time Frame (Transfer Goal 1, PT) by discharge  -MF      Barriers (Transfers Goal 1, PT) cardiopulmonary status  -MF      Progress/Outcome (Transfer Goal 1, PT) goal not met  -MF         Gait Training Goal 1 (PT)    Activity/Assistive Device (Gait Training Goal 1, PT) gait (walking locomotion);improve balance and speed;increase endurance/gait distance  -MF      [place] Level (Gait Training Goal 1, PT) independent  -MF      Distance (Gait Goal 1, PT) 150  -MF      Time Frame (Gait Training Goal 1, PT) by  discharge  -MF      Barriers (Gait Training Goal 1, PT) cardiopulmonary status  -MF      Progress/Outcome (Gait Training Goal 1, PT) goal not met  -MF         Stairs Goal 1 (PT)    Activity/Assistive Device (Stairs Goal 1, PT) stairs, all skills  -MF      [place] Level/Cues Needed (Stairs Goal 1, PT) independent  -MF      Number of Stairs (Stairs Goal 1, PT) 2   2 HR assist  -MF      Time Frame (Stairs Goal 1, PT) by discharge  -MF      Barriers (Stairs Goal 1, PT) cardiopulmonary status  -MF      Progress/Outcome (Stairs Goal 1, PT) goal not met  -MF        User Key  (r) = Recorded By, (t) = Taken By, (c) = Cosigned By    Initials Name Provider Type Discipline     Bibi Kolb, PTA Physical Therapy Assistant PT              PT Discharge Summary  Anticipated Discharge Disposition (PT): other (see comments) (comfort measures)  Reason for Discharge: Change in medical status (comfort measures)  Outcomes Achieved: Other  Discharge Destination: other (comment) (comfort measures)      Bbii Kolb, JENNIFER   7/7/2018

## 2018-07-07 NOTE — PLAN OF CARE
Problem: Patient Care Overview  Goal: Plan of Care Review  Outcome: Ongoing (interventions implemented as appropriate)   07/07/18 1509   Coping/Psychosocial   Plan of Care Reviewed With patient   Plan of Care Review   Progress declining   OTHER   Outcome Summary Comfort measures continue. Pt respond to pain. Turned q2. Cath care performed. Family at bedside. Will continue to monitor.      Goal: Individualization and Mutuality  Outcome: Ongoing (interventions implemented as appropriate)    Goal: Discharge Needs Assessment  Outcome: Ongoing (interventions implemented as appropriate)    Goal: Interprofessional Rounds/Family Conf  Outcome: Ongoing (interventions implemented as appropriate)      Problem: Fall Risk (Adult)  Goal: Absence of Fall  Outcome: Ongoing (interventions implemented as appropriate)      Problem: Skin Injury Risk (Adult)  Goal: Skin Health and Integrity  Outcome: Ongoing (interventions implemented as appropriate)      Problem: Nutrition, Imbalanced: Inadequate Oral Intake (Adult)  Goal: Improved Oral Intake  Outcome: Ongoing (interventions implemented as appropriate)    Goal: Prevent Further Weight Loss  Outcome: Ongoing (interventions implemented as appropriate)      Problem: Dying Patient, Actively (Adult)  Goal: Identify Related Risk Factors and Signs and Symptoms  Outcome: Ongoing (interventions implemented as appropriate)    Goal: Comfort/Pain Control  Outcome: Ongoing (interventions implemented as appropriate)    Goal: Peace/Preservation of Dignity During the Dying Process  Outcome: Ongoing (interventions implemented as appropriate)

## 2018-07-07 NOTE — PLAN OF CARE
Problem: Patient Care Overview  Goal: Plan of Care Review  Outcome: Ongoing (interventions implemented as appropriate)    Goal: Individualization and Mutuality  Outcome: Ongoing (interventions implemented as appropriate)    Goal: Discharge Needs Assessment  Outcome: Ongoing (interventions implemented as appropriate)    Goal: Interprofessional Rounds/Family Conf  Outcome: Ongoing (interventions implemented as appropriate)      Problem: Dying Patient, Actively (Adult)  Goal: Identify Related Risk Factors and Signs and Symptoms  Outcome: Ongoing (interventions implemented as appropriate)    Goal: Comfort/Pain Control  Outcome: Ongoing (interventions implemented as appropriate)    Goal: Peace/Preservation of Dignity During the Dying Process  Outcome: Ongoing (interventions implemented as appropriate)

## 2018-07-08 NOTE — PROGRESS NOTES
Dipesh is becoming more unresponsive and family is leaning toward comfort care now--Ms Pruitt wants to discuss with her daughter first    Review of Systems   Constitutional: Positive for activity change, appetite change and fatigue.   HENT: Negative.    Eyes: Negative.    Respiratory: Positive for cough, shortness of breath and wheezing.    Cardiovascular: Negative.    Gastrointestinal: Negative.    Endocrine: Negative.    Genitourinary: Positive for difficulty urinating.   Musculoskeletal: Negative.    Skin: Negative.    Allergic/Immunologic: Negative.    Neurological: Negative.    Hematological: Negative.    Psychiatric/Behavioral: Positive for confusion and decreased concentration.     Temp:  [98.7 °F (37.1 °C)-99 °F (37.2 °C)] 98.7 °F (37.1 °C)  Heart Rate:  [107-117] 117  Resp:  [18-22] 22  BP: (109-121)/(74-86) 121/86  I/O last 3 completed shifts:  In: -   Out: 975 [Urine:975]  No intake/output data recorded.    Physical Exam   Constitutional: He appears well-developed and well-nourished.   HENT:   Head: Normocephalic and atraumatic.   Right Ear: External ear normal.   Left Ear: External ear normal.   Nose: Nose normal.   Mouth/Throat: Oropharynx is clear and moist.   Eyes: Conjunctivae and EOM are normal. Pupils are equal, round, and reactive to light.   Neck: Normal range of motion. Neck supple.   Cardiovascular: Normal rate, regular rhythm, normal heart sounds and intact distal pulses.    Abdominal: Soft. Bowel sounds are normal.   Musculoskeletal: Normal range of motion.   Skin: Skin is warm and dry. Capillary refill takes less than 2 seconds.   Psychiatric: He has a normal mood and affect. His behavior is normal. Judgment and thought content normal.   Nursing note and vitals reviewed.      Active Problems:    Acute respiratory failure (CMS/HCC)    Lung neoplasm    Transient alteration of awareness    Will initiate comfort care when family is ready

## 2018-07-08 NOTE — PLAN OF CARE
Problem: Patient Care Overview  Goal: Plan of Care Review  Outcome: Ongoing (interventions implemented as appropriate)   07/08/18 0404   Coping/Psychosocial   Plan of Care Reviewed With patient   Plan of Care Review   Progress declining   OTHER   Outcome Summary No changes; comfort measures continue. Repositioning and VS per family request. Will continue to monitor.       Problem: Fall Risk (Adult)  Goal: Absence of Fall  Outcome: Ongoing (interventions implemented as appropriate)      Problem: Skin Injury Risk (Adult)  Goal: Skin Health and Integrity  Outcome: Ongoing (interventions implemented as appropriate)      Problem: Nutrition, Imbalanced: Inadequate Oral Intake (Adult)  Goal: Improved Oral Intake  Outcome: Unable to achieve outcome(s) by discharge Date Met: 07/08/18    Goal: Prevent Further Weight Loss  Outcome: Ongoing (interventions implemented as appropriate)      Problem: Dying Patient, Actively (Adult)  Goal: Identify Related Risk Factors and Signs and Symptoms  Outcome: Ongoing (interventions implemented as appropriate)    Goal: Comfort/Pain Control  Outcome: Ongoing (interventions implemented as appropriate)    Goal: Peace/Preservation of Dignity During the Dying Process  Outcome: Ongoing (interventions implemented as appropriate)

## 2018-07-08 NOTE — PROGRESS NOTES
Mrs Pruitt stated Dipesh has a restless night but got comfortable with ativan    Review of Systems   Constitutional: Positive for activity change and appetite change.   HENT: Negative.    Eyes: Negative.    Respiratory: Positive for cough, shortness of breath and wheezing.    Cardiovascular: Negative.    Gastrointestinal: Negative.    Endocrine: Negative.    Genitourinary: Negative.    Musculoskeletal: Negative.    Skin: Negative.    Allergic/Immunologic: Negative.    Neurological: Negative.    Hematological: Negative.    Psychiatric/Behavioral: Positive for decreased concentration.     Temp:  [98.7 °F (37.1 °C)-99 °F (37.2 °C)] 98.7 °F (37.1 °C)  Heart Rate:  [107-117] 117  Resp:  [18-22] 22  BP: (109-121)/(74-86) 121/86  I/O last 3 completed shifts:  In: -   Out: 975 [Urine:975]  No intake/output data recorded.    Physical Exam   Constitutional: He appears distressed.   HENT:   Head: Normocephalic and atraumatic.   Right Ear: External ear normal.   Left Ear: External ear normal.   Mouth/Throat: Oropharynx is clear and moist.   Eyes: Conjunctivae and EOM are normal. Pupils are equal, round, and reactive to light.   Neck: Normal range of motion. Neck supple.   Cardiovascular: Normal rate, regular rhythm, normal heart sounds and intact distal pulses.    Abdominal: Soft. Bowel sounds are normal.   Skin: Skin is warm. Capillary refill takes less than 2 seconds.   Nursing note and vitals reviewed.      Active Problems:    Acute respiratory failure (CMS/HCC)    Lung neoplasm    Transient alteration of awareness    Family plans to take him home with Hospice tomorrow if possible

## 2018-07-08 NOTE — PROGRESS NOTES
Dipesh remains unresponsive    Review of Systems   Constitutional: Positive for activity change, appetite change and fatigue.   HENT: Negative.    Eyes: Negative.    Respiratory: Positive for cough, shortness of breath and wheezing.    Cardiovascular: Negative.    Gastrointestinal: Negative.    Endocrine: Negative.    Genitourinary: Negative.    Musculoskeletal: Negative.    Skin: Positive for wound.   Allergic/Immunologic: Negative.    Neurological: Negative.    Hematological: Negative.    Psychiatric/Behavioral: Positive for agitation, confusion and decreased concentration.     Temp:  [98.7 °F (37.1 °C)-99 °F (37.2 °C)] 98.7 °F (37.1 °C)  Heart Rate:  [107-117] 117  Resp:  [18-22] 22  BP: (109-121)/(74-86) 121/86  I/O last 3 completed shifts:  In: -   Out: 975 [Urine:975]  No intake/output data recorded.    Physical Exam   Constitutional: He appears well-developed and well-nourished.   HENT:   Head: Normocephalic and atraumatic.   Right Ear: External ear normal.   Left Ear: External ear normal.   Nose: Nose normal.   Mouth/Throat: Oropharynx is clear and moist.   Eyes: Conjunctivae and EOM are normal. Pupils are equal, round, and reactive to light.   Neck: Normal range of motion. Neck supple.   Cardiovascular: Normal rate, regular rhythm, normal heart sounds and intact distal pulses.    Pulmonary/Chest: He is in respiratory distress. He has wheezes. He has rales.   Abdominal: Soft. Bowel sounds are normal.   Musculoskeletal: Normal range of motion.   Skin: Skin is warm. Capillary refill takes less than 2 seconds.   Nursing note and vitals reviewed.      Active Problems:    Acute respiratory failure (CMS/HCC)    Lung neoplasm    Transient alteration of awareness  Family is planning on going home with Hospice care on Monday if still alive       no

## 2018-07-08 NOTE — PLAN OF CARE
Problem: Patient Care Overview  Goal: Plan of Care Review  Outcome: Ongoing (interventions implemented as appropriate)   07/08/18 1528   Coping/Psychosocial   Plan of Care Reviewed With patient   Plan of Care Review   Progress declining   OTHER   Outcome Summary Repositioning per family memebers request. Comfort measures continue. Morphine and ativan changed to PO. Antipcated home with hospice soon. Will continue to monitor.      Goal: Individualization and Mutuality  Outcome: Ongoing (interventions implemented as appropriate)    Goal: Discharge Needs Assessment  Outcome: Ongoing (interventions implemented as appropriate)    Goal: Interprofessional Rounds/Family Conf  Outcome: Ongoing (interventions implemented as appropriate)      Problem: Fall Risk (Adult)  Goal: Absence of Fall  Outcome: Ongoing (interventions implemented as appropriate)      Problem: Skin Injury Risk (Adult)  Goal: Skin Health and Integrity  Outcome: Ongoing (interventions implemented as appropriate)      Problem: Nutrition, Imbalanced: Inadequate Oral Intake (Adult)  Goal: Prevent Further Weight Loss  Outcome: Ongoing (interventions implemented as appropriate)      Problem: Dying Patient, Actively (Adult)  Goal: Identify Related Risk Factors and Signs and Symptoms  Outcome: Ongoing (interventions implemented as appropriate)    Goal: Comfort/Pain Control  Outcome: Ongoing (interventions implemented as appropriate)    Goal: Peace/Preservation of Dignity During the Dying Process  Outcome: Ongoing (interventions implemented as appropriate)

## 2018-07-08 NOTE — PROGRESS NOTES
Dipesh remains unresponsive--family has decided upon comfort care and hospice    Review of Systems   Constitutional: Positive for appetite change and fatigue.   HENT: Negative.    Eyes: Negative.    Respiratory: Positive for shortness of breath and wheezing.    Cardiovascular: Negative.    Gastrointestinal: Negative.    Endocrine: Negative.    Genitourinary: Negative.    Musculoskeletal: Negative.    Skin: Positive for wound.   Allergic/Immunologic: Negative.    Neurological: Positive for weakness.   Hematological: Negative.    Psychiatric/Behavioral: Positive for behavioral problems, confusion and decreased concentration.     Temp:  [98.7 °F (37.1 °C)-99 °F (37.2 °C)] 98.7 °F (37.1 °C)  Heart Rate:  [107-117] 117  Resp:  [18-22] 22  BP: (109-121)/(74-86) 121/86  I/O last 3 completed shifts:  In: -   Out: 975 [Urine:975]  No intake/output data recorded.    Physical Exam   Constitutional: He appears well-developed and well-nourished.   HENT:   Head: Normocephalic.   Right Ear: External ear normal.   Left Ear: External ear normal.   Nose: Nose normal.   Mouth/Throat: Oropharynx is clear and moist.   Eyes: Conjunctivae and EOM are normal. Pupils are equal, round, and reactive to light.   Neck: Normal range of motion. Neck supple.   Cardiovascular: Normal rate, regular rhythm and intact distal pulses.    Abdominal: Soft. Bowel sounds are normal.   Musculoskeletal: Normal range of motion.   Skin: Skin is warm. Capillary refill takes less than 2 seconds.   Psychiatric: He has a normal mood and affect. His behavior is normal. Judgment and thought content normal.   Nursing note and vitals reviewed.      Active Problems:    Acute respiratory failure (CMS/HCC)    Lung neoplasm    Transient alteration of awareness    Comfort measures at this point--hospice consultation

## 2018-07-09 NOTE — THERAPY DISCHARGE NOTE
Acute Care - Speech Language Pathology Discharge Summary  [facility]       Patient Name: Dipesh Pruitt  : 1940  MRN: [id number]    Today's Date: 2018  Onset of Illness/Injury or Date of Surgery: 18       Referring Physician: Dr. Keller        Admit Date: 2018      SLP Recommendation and Plan    Visit Dx:    ICD-10-CM ICD-9-CM   1. 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 Lung mass R91.8 786.6   4. Vascular abnormality I99.9 459.9   5. Oropharyngeal dysphagia R13.12 787.22   6. Other emphysema (CMS/[facility]) J43.8 492.8   7. Acute respiratory failure with hypoxia (CMS/[facility]) J96.01 518.81                     SLP GOALS     Row Name 18 1100             Oral Nutrition/Hydration Goal 1 (SLP)    Progress/Outcomes (Oral Nutrition/Hydration Goal 1, SLP) goal not met;discharged from facility  -CS         Lingual Strengthening Goal 1 (SLP)    Progress/Outcomes (Lingual Strengthening Goal 1, SLP) goal not met;discharged from facility  -CS         Pharyngeal Strengthening Exercise Goal 1 (SLP)    Progress/Outcomes (Pharyngeal Strengthening Goal 1, SLP) goal not met;discharged from facility  -CS        User Key  (r) = Recorded By, (t) = Taken By, (c) = Cosigned By    Initials Name Provider Type    CS Cesar Friend MS CCC-SLP Speech and Language Pathologist                  SLP Discharge Summary  Anticipated Dischage Disposition: other (see comments) (Pt )  Reason for Discharge: other (see comments) (Pt )  Progress Toward Achieving Short/long Term Goals: other (see comments) (Pt )  Discharge Destination: other (see comments) (Pt )      Cesar Friend MS CCC-SLP  2018

## 2018-07-09 NOTE — NURSING NOTE
Body cleaned, all lines removed. House supervisor Citlaly Maloney notified, Dr Chen who was oncall for Dr Keller notified. [place]  [facility] here to  body with dentures and personal blanket. All other belongings returned to family.

## 2018-07-16 NOTE — DISCHARGE SUMMARY
"[facility]   DISCHARGE SUMMARY       Date of Admission: 2018  Date of Discharge:  7/15/2018  Primary Care Physician: Jake Keller MD    Presenting Problem/History of Present Illness:  Acute respiratory failure (CMS/HCC) [J96.00]     Final Discharge Diagnoses:  Hospital Problem List     Acute respiratory failure (CMS/HCC)    Lung neoplasm    Overview Signed 2018  2:44 PM by Aura Marcano PA-C     Stage IIIB (cT4, cN2, cMx)         Transient alteration of awareness          Consults: oncology, pulmonary, rad onc, neurosurgery    Procedures Performed: rad tx, mri of brain    Pertinent Test Results: noted brain lesion on mri, noted bx results     Chief Complaint on Day of Discharge: none    History of Present Illness on Day of Discharge: worsening resp failure      Hospital Course:  The patient is a 77 y.o. male who presented to [facility] with worsening cough and sob--recently tx at [facility] for pneumonia howver ct scan did confirm lung mass---seen by pulmonary and heme onc--bx was positive for cancer--seen by rad onc and this was initiated----howeever he continued to decline and family elected for comfort care only--he continued to decline and was declared ..      Condition on Discharge:      Physical Exam on Discharge:  /86 (BP Location: Left arm, Patient Position: Lying)   Pulse 117   Temp 98.7 °F (37.1 °C) (Temporal Artery )   Resp 22   Ht 165.1 cm (65\")   Wt 80.5 kg (177 lb 6.4 oz)   SpO2 (!) 76%   BMI 29.52 kg/m²   Physical Exam   Nursing note and vitals reviewed.        Discharge Disposition:      Discharge Medications:     Discharge Medications      Continue These Medications      Instructions Start Date   lovastatin 20 MG tablet  Commonly known as:  MEVACOR   20 mg, Oral, Nightly      Umeclidinium Bromide 62.5 MCG/INH aerosol powder    1 puff, Inhalation, Daily         Stop These Medications    aspirin 81 MG EC tablet      "       Discharge Diet:   none  Activity at Discharge:   none  Discharge Care Plan/Instructions: none    Follow-up Appointments:   No future appointments.    Test Results Pending at Discharge: none    Jake Keller MD  07/15/18  9:34 PM    Time: 9:33

## 2018-08-06 LAB — FUNGUS WND CULT: ABNORMAL

## 2018-08-19 LAB
MYCOBACTERIUM SPEC CULT: NORMAL
NIGHT BLUE STAIN TISS: NORMAL
NIGHT BLUE STAIN TISS: NORMAL

## (undated) DEVICE — SINGLE USE BIOPSY VALVE MAJ-210: Brand: SINGLE USE BIOPSY VALVE (STERILE)

## (undated) DEVICE — SENSR O2 OXIMAX FNGR A/ 18IN NONSTR

## (undated) DEVICE — SINGLE USE SUCTION VALVE MAJ-209: Brand: SINGLE USE SUCTION VALVE (STERILE)

## (undated) DEVICE — TBG PRESS EXT

## (undated) DEVICE — Device: Brand: BALLOON

## (undated) DEVICE — ADAPT SWVL FIBROPTIC BRONCH

## (undated) DEVICE — TBG SMPL FLTR LINE NASL 02/C02 A/ BX/100

## (undated) DEVICE — THE CHANNEL CLEANING BRUSH IS A NYLON FLEXI BRUSH ATTACHED TO A FLEXIBLE PLASTIC SHEATH DESIGNED TO SAFELY REMOVE DEBRIS FROM FLEXIBLE ENDOSCOPES.

## (undated) DEVICE — TRAP,MUCUS SPECIMEN, 80CC: Brand: MEDLINE

## (undated) DEVICE — KT ASP VIZISHOT 5SYRG 5BIOPSY/VLV 22G